# Patient Record
Sex: MALE | Race: ASIAN | Employment: OTHER | URBAN - METROPOLITAN AREA
[De-identification: names, ages, dates, MRNs, and addresses within clinical notes are randomized per-mention and may not be internally consistent; named-entity substitution may affect disease eponyms.]

---

## 2017-12-14 ENCOUNTER — HOSPITAL ENCOUNTER (OUTPATIENT)
Dept: ULTRASOUND IMAGING | Facility: HOSPITAL | Age: 72
Discharge: HOME | End: 2017-12-14
Attending: SPECIALIST
Payer: COMMERCIAL

## 2017-12-14 DIAGNOSIS — N18.30 CHRONIC KIDNEY DISEASE, STAGE III (MODERATE) (HCC): ICD-10-CM

## 2017-12-14 PROCEDURE — 76775 US EXAM ABDO BACK WALL LIM: CPT | Performed by: SPECIALIST

## 2018-01-25 ENCOUNTER — OFFICE VISIT (OUTPATIENT)
Dept: SLEEP CENTER | Facility: HOSPITAL | Age: 73
End: 2018-01-25
Attending: SPECIALIST
Payer: COMMERCIAL

## 2018-01-25 PROCEDURE — 95810 POLYSOM 6/> YRS 4/> PARAM: CPT

## 2018-01-30 NOTE — PROCEDURES
[address]       Accredited by the [facility] (AASM)    PATIENT'S NAME:        [facility] PHYSICIAN:   Johnny Bagley M.D. REFERRING PHYSICIAN:   Lan Harding M.D.   P latency was 8.9 minutes. Wake after sleep onset was 110.5 minutes. During sleep, all stages of sleep were seen. Slow-wave sleep comprised 10.6% of total sleep time. REM sleep occupied 12.8% of total sleep time with a REM latency of 103.4 minutes.   Ther needs to understand the potential dangers associated with reduced daytime vigilance. 5.   Consider further evaluation for restless leg syndrome. Thank you for your confidence in the [facility].   If you have any questions, please feel free to c

## 2019-04-02 ENCOUNTER — HOSPITAL (OUTPATIENT)
Dept: OTHER | Age: 74
End: 2019-04-02
Attending: SPECIALIST

## 2019-04-09 ENCOUNTER — ORDER TRANSCRIPTION (OUTPATIENT)
Dept: SLEEP CENTER | Facility: HOSPITAL | Age: 74
End: 2019-04-09

## 2019-04-09 DIAGNOSIS — R06.83 SNORING: Primary | ICD-10-CM

## 2019-04-09 DIAGNOSIS — R06.81 APNEA: ICD-10-CM

## 2019-04-12 ENCOUNTER — HOSPITAL ENCOUNTER (OUTPATIENT)
Dept: CV DIAGNOSTICS | Facility: HOSPITAL | Age: 74
Discharge: HOME | End: 2019-04-12
Attending: SPECIALIST
Payer: MEDICARE

## 2019-04-12 DIAGNOSIS — I25.10 CAD (CORONARY ARTERY DISEASE): ICD-10-CM

## 2019-04-12 DIAGNOSIS — R07.9 CHEST PAIN: ICD-10-CM

## 2019-04-12 PROCEDURE — 78452 HT MUSCLE IMAGE SPECT MULT: CPT | Performed by: SPECIALIST

## 2019-04-12 PROCEDURE — 93018 CV STRESS TEST I&R ONLY: CPT | Performed by: SPECIALIST

## 2019-04-12 PROCEDURE — 93017 CV STRESS TEST TRACING ONLY: CPT | Performed by: SPECIALIST

## 2019-04-24 ENCOUNTER — OFFICE VISIT (OUTPATIENT)
Dept: SLEEP CENTER | Facility: HOSPITAL | Age: 74
End: 2019-04-24
Attending: SPECIALIST
Payer: MEDICARE

## 2019-04-24 PROCEDURE — 95811 POLYSOM 6/>YRS CPAP 4/> PARM: CPT

## 2019-04-27 NOTE — PROCEDURES
[address] [place] [address]       Accredited by the [facility] (AASM)    PATIENT'S NAME:        Rebolledo Favre PHYSICIAN:   Edward Ashley M.D. REFERRING PHYSICIAN:   Phi Hu M.D.   P with AASM recommendations, hypopnea events are scored based on an oxygen saturation more than or equal to 4 percent. Body position is documented via technician notes every 15 minutes.  There is an additional channel for measurement of CPAP flow for the titr regarding the results of the study and make treatment recommendations. 2.   The patient can be initiated on CPAP at a pressure of 8 with heated humidity and the appropriate mask. 3.   Follow the patient closely to assess his clinical response to therapy.

## 2021-01-01 ENCOUNTER — APPOINTMENT (OUTPATIENT)
Dept: GENERAL RADIOLOGY | Facility: HOSPITAL | Age: 76
DRG: 207 | End: 2021-01-01
Attending: HOSPITALIST
Payer: MEDICARE

## 2021-01-01 ENCOUNTER — APPOINTMENT (OUTPATIENT)
Dept: GENERAL RADIOLOGY | Facility: HOSPITAL | Age: 76
DRG: 207 | End: 2021-01-01
Attending: STUDENT IN AN ORGANIZED HEALTH CARE EDUCATION/TRAINING PROGRAM
Payer: MEDICARE

## 2021-01-01 ENCOUNTER — APPOINTMENT (OUTPATIENT)
Dept: CT IMAGING | Facility: HOSPITAL | Age: 76
DRG: 207 | End: 2021-01-01
Attending: INTERNAL MEDICINE
Payer: MEDICARE

## 2021-01-01 ENCOUNTER — APPOINTMENT (OUTPATIENT)
Dept: ULTRASOUND IMAGING | Facility: HOSPITAL | Age: 76
DRG: 207 | End: 2021-01-01
Attending: INTERNAL MEDICINE
Payer: MEDICARE

## 2021-01-01 ENCOUNTER — APPOINTMENT (OUTPATIENT)
Dept: GENERAL RADIOLOGY | Facility: HOSPITAL | Age: 76
DRG: 207 | End: 2021-01-01
Attending: NURSE PRACTITIONER
Payer: MEDICARE

## 2021-01-01 ENCOUNTER — APPOINTMENT (OUTPATIENT)
Dept: GENERAL RADIOLOGY | Facility: HOSPITAL | Age: 76
DRG: 207 | End: 2021-01-01
Attending: INTERNAL MEDICINE
Payer: MEDICARE

## 2021-01-01 ENCOUNTER — APPOINTMENT (OUTPATIENT)
Dept: GENERAL RADIOLOGY | Facility: HOSPITAL | Age: 76
DRG: 207 | End: 2021-01-01
Attending: EMERGENCY MEDICINE
Payer: MEDICARE

## 2021-01-01 ENCOUNTER — ANESTHESIA EVENT (OUTPATIENT)
Dept: MEDSURG UNIT | Facility: HOSPITAL | Age: 76
DRG: 207 | End: 2021-01-01
Payer: MEDICARE

## 2021-01-01 ENCOUNTER — APPOINTMENT (OUTPATIENT)
Dept: CV DIAGNOSTICS | Facility: HOSPITAL | Age: 76
DRG: 207 | End: 2021-01-01
Attending: HOSPITALIST
Payer: MEDICARE

## 2021-01-01 ENCOUNTER — APPOINTMENT (OUTPATIENT)
Dept: CT IMAGING | Facility: HOSPITAL | Age: 76
DRG: 207 | End: 2021-01-01
Attending: HOSPITALIST
Payer: MEDICARE

## 2021-01-01 ENCOUNTER — APPOINTMENT (OUTPATIENT)
Dept: ULTRASOUND IMAGING | Facility: HOSPITAL | Age: 76
DRG: 207 | End: 2021-01-01
Attending: STUDENT IN AN ORGANIZED HEALTH CARE EDUCATION/TRAINING PROGRAM
Payer: MEDICARE

## 2021-01-01 ENCOUNTER — HOSPITAL ENCOUNTER (INPATIENT)
Facility: HOSPITAL | Age: 76
LOS: 35 days | DRG: 207 | End: 2021-01-01
Attending: EMERGENCY MEDICINE | Admitting: SPECIALIST
Payer: MEDICARE

## 2021-01-01 ENCOUNTER — ANESTHESIA (OUTPATIENT)
Dept: MEDSURG UNIT | Facility: HOSPITAL | Age: 76
DRG: 207 | End: 2021-01-01
Payer: MEDICARE

## 2021-01-01 ENCOUNTER — ANESTHESIA EVENT (OUTPATIENT)
Dept: ENDOSCOPY | Facility: HOSPITAL | Age: 76
DRG: 207 | End: 2021-01-01
Payer: MEDICARE

## 2021-01-01 ENCOUNTER — ANESTHESIA (OUTPATIENT)
Dept: ENDOSCOPY | Facility: HOSPITAL | Age: 76
DRG: 207 | End: 2021-01-01
Payer: MEDICARE

## 2021-01-01 VITALS
TEMPERATURE: 99 F | BODY MASS INDEX: 25.75 KG/M2 | DIASTOLIC BLOOD PRESSURE: 30 MMHG | HEIGHT: 65 IN | OXYGEN SATURATION: 47 % | WEIGHT: 154.56 LBS | SYSTOLIC BLOOD PRESSURE: 77 MMHG

## 2021-01-01 DIAGNOSIS — K92.2 GI BLEEDING: ICD-10-CM

## 2021-01-01 DIAGNOSIS — U07.1 PNEUMONIA DUE TO COVID-19 VIRUS: ICD-10-CM

## 2021-01-01 DIAGNOSIS — J12.82 PNEUMONIA DUE TO COVID-19 VIRUS: ICD-10-CM

## 2021-01-01 DIAGNOSIS — R09.02 HYPOXEMIA: Primary | ICD-10-CM

## 2021-01-01 DIAGNOSIS — E87.1 HYPONATREMIA: ICD-10-CM

## 2021-01-01 PROCEDURE — 5A0955A ASSISTANCE WITH RESPIRATORY VENTILATION, GREATER THAN 96 CONSECUTIVE HOURS, HIGH NASAL FLOW/VELOCITY: ICD-10-PCS | Performed by: STUDENT IN AN ORGANIZED HEALTH CARE EDUCATION/TRAINING PROGRAM

## 2021-01-01 PROCEDURE — 99231 SBSQ HOSP IP/OBS SF/LOW 25: CPT | Performed by: INTERNAL MEDICINE

## 2021-01-01 PROCEDURE — XW033E5 INTRODUCTION OF REMDESIVIR ANTI-INFECTIVE INTO PERIPHERAL VEIN, PERCUTANEOUS APPROACH, NEW TECHNOLOGY GROUP 5: ICD-10-PCS | Performed by: INTERNAL MEDICINE

## 2021-01-01 PROCEDURE — 99232 SBSQ HOSP IP/OBS MODERATE 35: CPT | Performed by: INTERNAL MEDICINE

## 2021-01-01 PROCEDURE — 71045 X-RAY EXAM CHEST 1 VIEW: CPT | Performed by: INTERNAL MEDICINE

## 2021-01-01 PROCEDURE — 99233 SBSQ HOSP IP/OBS HIGH 50: CPT | Performed by: INTERNAL MEDICINE

## 2021-01-01 PROCEDURE — 93306 TTE W/DOPPLER COMPLETE: CPT | Performed by: HOSPITALIST

## 2021-01-01 PROCEDURE — 71045 X-RAY EXAM CHEST 1 VIEW: CPT | Performed by: EMERGENCY MEDICINE

## 2021-01-01 PROCEDURE — 0DB68ZX EXCISION OF STOMACH, VIA NATURAL OR ARTIFICIAL OPENING ENDOSCOPIC, DIAGNOSTIC: ICD-10-PCS | Performed by: STUDENT IN AN ORGANIZED HEALTH CARE EDUCATION/TRAINING PROGRAM

## 2021-01-01 PROCEDURE — 71275 CT ANGIOGRAPHY CHEST: CPT | Performed by: INTERNAL MEDICINE

## 2021-01-01 PROCEDURE — 74018 RADEX ABDOMEN 1 VIEW: CPT | Performed by: NURSE PRACTITIONER

## 2021-01-01 PROCEDURE — 99223 1ST HOSP IP/OBS HIGH 75: CPT | Performed by: INTERNAL MEDICINE

## 2021-01-01 PROCEDURE — 3E033XZ INTRODUCTION OF VASOPRESSOR INTO PERIPHERAL VEIN, PERCUTANEOUS APPROACH: ICD-10-PCS | Performed by: INTERNAL MEDICINE

## 2021-01-01 PROCEDURE — 71045 X-RAY EXAM CHEST 1 VIEW: CPT | Performed by: NURSE PRACTITIONER

## 2021-01-01 PROCEDURE — 3E0333Z INTRODUCTION OF ANTI-INFLAMMATORY INTO PERIPHERAL VEIN, PERCUTANEOUS APPROACH: ICD-10-PCS | Performed by: EMERGENCY MEDICINE

## 2021-01-01 PROCEDURE — 0B9G8ZX DRAINAGE OF LEFT UPPER LUNG LOBE, VIA NATURAL OR ARTIFICIAL OPENING ENDOSCOPIC, DIAGNOSTIC: ICD-10-PCS | Performed by: HOSPITALIST

## 2021-01-01 PROCEDURE — 71045 X-RAY EXAM CHEST 1 VIEW: CPT | Performed by: HOSPITALIST

## 2021-01-01 PROCEDURE — B548ZZA ULTRASONOGRAPHY OF SUPERIOR VENA CAVA, GUIDANCE: ICD-10-PCS | Performed by: STUDENT IN AN ORGANIZED HEALTH CARE EDUCATION/TRAINING PROGRAM

## 2021-01-01 PROCEDURE — 76700 US EXAM ABDOM COMPLETE: CPT | Performed by: STUDENT IN AN ORGANIZED HEALTH CARE EDUCATION/TRAINING PROGRAM

## 2021-01-01 PROCEDURE — 30233N0 TRANSFUSION OF AUTOLOGOUS RED BLOOD CELLS INTO PERIPHERAL VEIN, PERCUTANEOUS APPROACH: ICD-10-PCS | Performed by: STUDENT IN AN ORGANIZED HEALTH CARE EDUCATION/TRAINING PROGRAM

## 2021-01-01 PROCEDURE — 0BH18EZ INSERTION OF ENDOTRACHEAL AIRWAY INTO TRACHEA, VIA NATURAL OR ARTIFICIAL OPENING ENDOSCOPIC: ICD-10-PCS | Performed by: ANESTHESIOLOGY

## 2021-01-01 PROCEDURE — 71045 X-RAY EXAM CHEST 1 VIEW: CPT | Performed by: STUDENT IN AN ORGANIZED HEALTH CARE EDUCATION/TRAINING PROGRAM

## 2021-01-01 PROCEDURE — 93970 EXTREMITY STUDY: CPT | Performed by: INTERNAL MEDICINE

## 2021-01-01 PROCEDURE — XW033H5 INTRODUCTION OF TOCILIZUMAB INTO PERIPHERAL VEIN, PERCUTANEOUS APPROACH, NEW TECHNOLOGY GROUP 5: ICD-10-PCS | Performed by: HOSPITALIST

## 2021-01-01 PROCEDURE — 0BJ08ZZ INSPECTION OF TRACHEOBRONCHIAL TREE, VIA NATURAL OR ARTIFICIAL OPENING ENDOSCOPIC: ICD-10-PCS | Performed by: HOSPITALIST

## 2021-01-01 PROCEDURE — 02HV33Z INSERTION OF INFUSION DEVICE INTO SUPERIOR VENA CAVA, PERCUTANEOUS APPROACH: ICD-10-PCS | Performed by: STUDENT IN AN ORGANIZED HEALTH CARE EDUCATION/TRAINING PROGRAM

## 2021-01-01 PROCEDURE — 74018 RADEX ABDOMEN 1 VIEW: CPT | Performed by: INTERNAL MEDICINE

## 2021-01-01 PROCEDURE — XW13325 TRANSFUSION OF CONVALESCENT PLASMA (NONAUTOLOGOUS) INTO PERIPHERAL VEIN, PERCUTANEOUS APPROACH, NEW TECHNOLOGY GROUP 5: ICD-10-PCS | Performed by: INTERNAL MEDICINE

## 2021-01-01 PROCEDURE — 71275 CT ANGIOGRAPHY CHEST: CPT | Performed by: HOSPITALIST

## 2021-01-01 PROCEDURE — 99291 CRITICAL CARE FIRST HOUR: CPT | Performed by: STUDENT IN AN ORGANIZED HEALTH CARE EDUCATION/TRAINING PROGRAM

## 2021-01-01 PROCEDURE — 5A1955Z RESPIRATORY VENTILATION, GREATER THAN 96 CONSECUTIVE HOURS: ICD-10-PCS | Performed by: ANESTHESIOLOGY

## 2021-01-01 PROCEDURE — 30233N1 TRANSFUSION OF NONAUTOLOGOUS RED BLOOD CELLS INTO PERIPHERAL VEIN, PERCUTANEOUS APPROACH: ICD-10-PCS | Performed by: STUDENT IN AN ORGANIZED HEALTH CARE EDUCATION/TRAINING PROGRAM

## 2021-01-01 RX ORDER — TOLVAPTAN 15 MG/1
15 TABLET ORAL ONCE
Status: COMPLETED | OUTPATIENT
Start: 2021-01-01 | End: 2021-01-01

## 2021-01-01 RX ORDER — DEXTROSE MONOHYDRATE 25 G/50ML
50 INJECTION, SOLUTION INTRAVENOUS
Status: DISCONTINUED | OUTPATIENT
Start: 2021-01-01 | End: 2021-01-01 | Stop reason: HOSPADM

## 2021-01-01 RX ORDER — ZINC SULFATE 50(220)MG
220 CAPSULE ORAL 2 TIMES DAILY
Status: DISCONTINUED | OUTPATIENT
Start: 2021-01-01 | End: 2021-01-01

## 2021-01-01 RX ORDER — SODIUM CHLORIDE 9 MG/ML
INJECTION, SOLUTION INTRAVENOUS ONCE
Status: COMPLETED | OUTPATIENT
Start: 2021-01-01 | End: 2021-01-01

## 2021-01-01 RX ORDER — ALBUMIN, HUMAN INJ 5% 5 %
SOLUTION INTRAVENOUS
Status: DISPENSED
Start: 2021-01-01 | End: 2021-01-01

## 2021-01-01 RX ORDER — GLIPIZIDE 10 MG/1
10 TABLET ORAL
COMMUNITY

## 2021-01-01 RX ORDER — ALBUTEROL SULFATE 90 UG/1
4 AEROSOL, METERED RESPIRATORY (INHALATION) EVERY 4 HOURS PRN
Status: DISCONTINUED | OUTPATIENT
Start: 2021-01-01 | End: 2021-01-01

## 2021-01-01 RX ORDER — ROSUVASTATIN CALCIUM 20 MG/1
20 TABLET, COATED ORAL NIGHTLY
COMMUNITY

## 2021-01-01 RX ORDER — DEXAMETHASONE SODIUM PHOSPHATE 4 MG/ML
6 VIAL (ML) INJECTION EVERY 24 HOURS
Status: COMPLETED | OUTPATIENT
Start: 2021-01-01 | End: 2021-01-01

## 2021-01-01 RX ORDER — DILTIAZEM HYDROCHLORIDE 5 MG/ML
2.5 INJECTION INTRAVENOUS EVERY 30 MIN PRN
Status: DISCONTINUED | OUTPATIENT
Start: 2021-01-01 | End: 2021-01-01

## 2021-01-01 RX ORDER — HYDROMORPHONE HYDROCHLORIDE 2 MG/1
2 TABLET ORAL EVERY 4 HOURS
Status: DISCONTINUED | OUTPATIENT
Start: 2021-01-01 | End: 2021-01-01

## 2021-01-01 RX ORDER — HEPARIN SODIUM 5000 [USP'U]/ML
5000 INJECTION, SOLUTION INTRAVENOUS; SUBCUTANEOUS EVERY 8 HOURS SCHEDULED
Status: DISCONTINUED | OUTPATIENT
Start: 2021-01-01 | End: 2021-01-01

## 2021-01-01 RX ORDER — GABAPENTIN 100 MG/1
100 CAPSULE ORAL 2 TIMES DAILY
Status: DISCONTINUED | OUTPATIENT
Start: 2021-01-01 | End: 2021-01-01 | Stop reason: SDUPTHER

## 2021-01-01 RX ORDER — POTASSIUM CHLORIDE 1.5 G/1.77G
40 POWDER, FOR SOLUTION ORAL ONCE
Status: COMPLETED | OUTPATIENT
Start: 2021-01-01 | End: 2021-01-01

## 2021-01-01 RX ORDER — ALBUTEROL SULFATE 90 UG/1
4 AEROSOL, METERED RESPIRATORY (INHALATION) 4 TIMES DAILY
Status: DISCONTINUED | OUTPATIENT
Start: 2021-01-01 | End: 2021-01-01

## 2021-01-01 RX ORDER — CHLORHEXIDINE GLUCONATE 0.12 MG/ML
15 RINSE ORAL
Status: DISCONTINUED | OUTPATIENT
Start: 2021-01-01 | End: 2021-01-01

## 2021-01-01 RX ORDER — PHENYLEPHRINE HCL IN 0.9% NACL 50MG/250ML
PLASTIC BAG, INJECTION (ML) INTRAVENOUS CONTINUOUS
Status: DISCONTINUED | OUTPATIENT
Start: 2021-01-01 | End: 2021-01-01

## 2021-01-01 RX ORDER — BUDESONIDE 0.5 MG/2ML
0.5 INHALANT ORAL
Status: DISCONTINUED | OUTPATIENT
Start: 2021-01-01 | End: 2021-01-01

## 2021-01-01 RX ORDER — ALBUMIN, HUMAN INJ 5% 5 %
250 SOLUTION INTRAVENOUS ONCE
Status: COMPLETED | OUTPATIENT
Start: 2021-01-01 | End: 2021-01-01

## 2021-01-01 RX ORDER — ACETYLCYSTEINE 200 MG/ML
2 SOLUTION ORAL; RESPIRATORY (INHALATION) EVERY 6 HOURS
Status: DISCONTINUED | OUTPATIENT
Start: 2021-01-01 | End: 2021-01-01

## 2021-01-01 RX ORDER — FUROSEMIDE 10 MG/ML
20 INJECTION INTRAMUSCULAR; INTRAVENOUS ONCE
Status: COMPLETED | OUTPATIENT
Start: 2021-01-01 | End: 2021-01-01

## 2021-01-01 RX ORDER — ACETYLCYSTEINE 200 MG/ML
150 INJECTION INTRAVENOUS ONCE
Status: DISCONTINUED | OUTPATIENT
Start: 2021-01-01 | End: 2021-01-01 | Stop reason: SDUPTHER

## 2021-01-01 RX ORDER — SODIUM CHLORIDE 9 MG/ML
INJECTION, SOLUTION INTRAVENOUS CONTINUOUS
Status: DISCONTINUED | OUTPATIENT
Start: 2021-01-01 | End: 2021-01-01

## 2021-01-01 RX ORDER — MINERAL OIL AND PETROLATUM 150; 830 MG/G; MG/G
OINTMENT OPHTHALMIC 2 TIMES DAILY
Status: DISCONTINUED | OUTPATIENT
Start: 2021-01-01 | End: 2021-01-01

## 2021-01-01 RX ORDER — ACETAMINOPHEN 160 MG
2000 TABLET,DISINTEGRATING ORAL DAILY
Status: DISCONTINUED | OUTPATIENT
Start: 2021-01-01 | End: 2021-01-01

## 2021-01-01 RX ORDER — NALOXONE HYDROCHLORIDE 0.4 MG/ML
80 INJECTION, SOLUTION INTRAMUSCULAR; INTRAVENOUS; SUBCUTANEOUS AS NEEDED
Status: DISCONTINUED | OUTPATIENT
Start: 2021-01-01 | End: 2021-01-01 | Stop reason: HOSPADM

## 2021-01-01 RX ORDER — ENOXAPARIN SODIUM 100 MG/ML
0.5 INJECTION SUBCUTANEOUS EVERY 12 HOURS SCHEDULED
Status: DISCONTINUED | OUTPATIENT
Start: 2021-01-01 | End: 2021-01-01

## 2021-01-01 RX ORDER — ETOMIDATE 2 MG/ML
INJECTION INTRAVENOUS
Status: DISPENSED
Start: 2021-01-01 | End: 2021-01-01

## 2021-01-01 RX ORDER — POTASSIUM CHLORIDE 20 MEQ/1
40 TABLET, EXTENDED RELEASE ORAL EVERY 4 HOURS
Status: COMPLETED | OUTPATIENT
Start: 2021-01-01 | End: 2021-01-01

## 2021-01-01 RX ORDER — SODIUM CHLORIDE 1000 MG
1 TABLET, SOLUBLE MISCELLANEOUS 2 TIMES DAILY WITH MEALS
Status: COMPLETED | OUTPATIENT
Start: 2021-01-01 | End: 2021-01-01

## 2021-01-01 RX ORDER — MORPHINE SULFATE 2 MG/ML
1 INJECTION, SOLUTION INTRAMUSCULAR; INTRAVENOUS EVERY 2 HOUR PRN
Status: DISCONTINUED | OUTPATIENT
Start: 2021-01-01 | End: 2021-01-01

## 2021-01-01 RX ORDER — DOCUSATE SODIUM 100 MG/1
100 CAPSULE, LIQUID FILLED ORAL 2 TIMES DAILY
Status: DISCONTINUED | OUTPATIENT
Start: 2021-01-01 | End: 2021-01-01

## 2021-01-01 RX ORDER — DEXAMETHASONE SODIUM PHOSPHATE 10 MG/ML
10 INJECTION, SOLUTION INTRAMUSCULAR; INTRAVENOUS ONCE
Status: COMPLETED | OUTPATIENT
Start: 2021-01-01 | End: 2021-01-01

## 2021-01-01 RX ORDER — SENNOSIDES 8.6 MG
8.6 TABLET ORAL DAILY
Status: DISCONTINUED | OUTPATIENT
Start: 2021-01-01 | End: 2021-01-01

## 2021-01-01 RX ORDER — DILTIAZEM HYDROCHLORIDE 5 MG/ML
INJECTION INTRAVENOUS
Status: COMPLETED
Start: 2021-01-01 | End: 2021-01-01

## 2021-01-01 RX ORDER — DEXTROSE AND SODIUM CHLORIDE 5; .45 G/100ML; G/100ML
INJECTION, SOLUTION INTRAVENOUS CONTINUOUS
Status: DISCONTINUED | OUTPATIENT
Start: 2021-01-01 | End: 2021-01-01

## 2021-01-01 RX ORDER — MAGNESIUM OXIDE 400 MG (241.3 MG MAGNESIUM) TABLET
400 TABLET ONCE
Status: COMPLETED | OUTPATIENT
Start: 2021-01-01 | End: 2021-01-01

## 2021-01-01 RX ORDER — ALBUTEROL SULFATE 90 UG/1
4 AEROSOL, METERED RESPIRATORY (INHALATION)
Status: DISCONTINUED | OUTPATIENT
Start: 2021-01-01 | End: 2021-01-01

## 2021-01-01 RX ORDER — SODIUM CHLORIDE, SODIUM LACTATE, POTASSIUM CHLORIDE, CALCIUM CHLORIDE 600; 310; 30; 20 MG/100ML; MG/100ML; MG/100ML; MG/100ML
INJECTION, SOLUTION INTRAVENOUS CONTINUOUS
Status: DISCONTINUED | OUTPATIENT
Start: 2021-01-01 | End: 2021-01-01

## 2021-01-01 RX ORDER — METOPROLOL TARTRATE 50 MG/1
50 TABLET, FILM COATED ORAL 2 TIMES DAILY
COMMUNITY

## 2021-01-01 RX ORDER — QUETIAPINE 100 MG/1
100 TABLET, FILM COATED ORAL EVERY 8 HOURS
Status: DISCONTINUED | OUTPATIENT
Start: 2021-01-01 | End: 2021-01-01

## 2021-01-01 RX ORDER — LACTULOSE 10 G/15ML
30 SOLUTION ORAL ONCE
Status: COMPLETED | OUTPATIENT
Start: 2021-01-01 | End: 2021-01-01

## 2021-01-01 RX ORDER — DEXMEDETOMIDINE HYDROCHLORIDE 4 UG/ML
INJECTION, SOLUTION INTRAVENOUS CONTINUOUS
Status: DISCONTINUED | OUTPATIENT
Start: 2021-01-01 | End: 2021-01-01 | Stop reason: HOSPADM

## 2021-01-01 RX ORDER — ECHINACEA PURPUREA EXTRACT 125 MG
1 TABLET ORAL
Status: DISCONTINUED | OUTPATIENT
Start: 2021-01-01 | End: 2021-01-01

## 2021-01-01 RX ORDER — ACETAMINOPHEN 325 MG/1
650 TABLET ORAL EVERY 6 HOURS PRN
Status: DISCONTINUED | OUTPATIENT
Start: 2021-01-01 | End: 2021-01-01

## 2021-01-01 RX ORDER — ACETYLCYSTEINE 200 MG/ML
600 SOLUTION ORAL; RESPIRATORY (INHALATION) 2 TIMES DAILY
Status: DISCONTINUED | OUTPATIENT
Start: 2021-01-01 | End: 2021-01-01

## 2021-01-01 RX ORDER — POLYETHYLENE GLYCOL 3350 17 G/17G
17 POWDER, FOR SOLUTION ORAL DAILY PRN
Status: DISCONTINUED | OUTPATIENT
Start: 2021-01-01 | End: 2021-01-01

## 2021-01-01 RX ORDER — ATORVASTATIN CALCIUM 40 MG/1
40 TABLET, FILM COATED ORAL NIGHTLY
Status: DISCONTINUED | OUTPATIENT
Start: 2021-01-01 | End: 2021-01-01

## 2021-01-01 RX ORDER — DEXAMETHASONE 4 MG/1
4 TABLET ORAL DAILY
Status: DISCONTINUED | OUTPATIENT
Start: 2021-01-01 | End: 2021-01-01

## 2021-01-01 RX ORDER — DEXTROSE MONOHYDRATE 25 G/50ML
50 INJECTION, SOLUTION INTRAVENOUS
Status: DISCONTINUED | OUTPATIENT
Start: 2021-01-01 | End: 2021-01-01

## 2021-01-01 RX ORDER — POTASSIUM CHLORIDE 1.5 G/1.77G
40 POWDER, FOR SOLUTION ORAL EVERY 4 HOURS
Status: COMPLETED | OUTPATIENT
Start: 2021-01-01 | End: 2021-01-01

## 2021-01-01 RX ORDER — LISINOPRIL 2.5 MG/1
2.5 TABLET ORAL DAILY
Status: DISCONTINUED | OUTPATIENT
Start: 2021-01-01 | End: 2021-01-01

## 2021-01-01 RX ORDER — METOPROLOL TARTRATE 5 MG/5ML
5 INJECTION INTRAVENOUS EVERY 6 HOURS PRN
Status: DISCONTINUED | OUTPATIENT
Start: 2021-01-01 | End: 2021-01-01

## 2021-01-01 RX ORDER — LISINOPRIL 2.5 MG/1
2.5 TABLET ORAL DAILY
COMMUNITY

## 2021-01-01 RX ORDER — ENOXAPARIN SODIUM 100 MG/ML
40 INJECTION SUBCUTANEOUS NIGHTLY
Status: DISCONTINUED | OUTPATIENT
Start: 2021-01-01 | End: 2021-01-01

## 2021-01-01 RX ORDER — METOPROLOL TARTRATE 50 MG/1
50 TABLET, FILM COATED ORAL
Status: DISCONTINUED | OUTPATIENT
Start: 2021-01-01 | End: 2021-01-01

## 2021-01-01 RX ORDER — IPRATROPIUM BROMIDE AND ALBUTEROL SULFATE 2.5; .5 MG/3ML; MG/3ML
3 SOLUTION RESPIRATORY (INHALATION)
Status: DISCONTINUED | OUTPATIENT
Start: 2021-01-01 | End: 2021-01-01

## 2021-01-01 RX ORDER — DEXAMETHASONE SODIUM PHOSPHATE 4 MG/ML
6 VIAL (ML) INJECTION EVERY 12 HOURS
Status: DISCONTINUED | OUTPATIENT
Start: 2021-01-01 | End: 2021-01-01

## 2021-01-01 RX ORDER — ACETAMINOPHEN 160 MG/5ML
650 SOLUTION ORAL EVERY 6 HOURS PRN
Status: DISCONTINUED | OUTPATIENT
Start: 2021-01-01 | End: 2021-01-01

## 2021-01-01 RX ORDER — DILTIAZEM HYDROCHLORIDE 5 MG/ML
2.5 INJECTION INTRAVENOUS ONCE
Status: COMPLETED | OUTPATIENT
Start: 2021-01-01 | End: 2021-01-01

## 2021-01-01 RX ORDER — ACARBOSE 50 MG/1
50 TABLET ORAL
COMMUNITY

## 2021-01-01 RX ORDER — ASPIRIN 81 MG/1
81 TABLET, CHEWABLE ORAL DAILY
Status: DISCONTINUED | OUTPATIENT
Start: 2021-01-01 | End: 2021-01-01

## 2021-01-01 RX ORDER — ASPIRIN 81 MG/1
81 TABLET ORAL DAILY
Status: DISCONTINUED | OUTPATIENT
Start: 2021-01-01 | End: 2021-01-01 | Stop reason: SDUPTHER

## 2021-01-01 RX ORDER — DEXMEDETOMIDINE HYDROCHLORIDE 4 UG/ML
INJECTION, SOLUTION INTRAVENOUS CONTINUOUS
Status: DISCONTINUED | OUTPATIENT
Start: 2021-01-01 | End: 2021-01-01

## 2021-01-01 RX ORDER — BISACODYL 10 MG
10 SUPPOSITORY, RECTAL RECTAL
Status: DISCONTINUED | OUTPATIENT
Start: 2021-01-01 | End: 2021-01-01

## 2021-01-01 RX ORDER — ASCORBIC ACID 500 MG
1000 TABLET ORAL DAILY
Status: DISCONTINUED | OUTPATIENT
Start: 2021-01-01 | End: 2021-01-01

## 2021-01-01 RX ORDER — GUAIFENESIN 600 MG
600 TABLET, EXTENDED RELEASE 12 HR ORAL 2 TIMES DAILY
Status: DISCONTINUED | OUTPATIENT
Start: 2021-01-01 | End: 2021-01-01 | Stop reason: SDUPTHER

## 2021-01-01 RX ORDER — FUROSEMIDE 10 MG/ML
20 INJECTION INTRAMUSCULAR; INTRAVENOUS 3 TIMES DAILY
Status: DISCONTINUED | OUTPATIENT
Start: 2021-01-01 | End: 2021-01-01

## 2021-01-01 RX ORDER — SODIUM PHOSPHATE, DIBASIC AND SODIUM PHOSPHATE, MONOBASIC 7; 19 G/133ML; G/133ML
1 ENEMA RECTAL ONCE AS NEEDED
Status: DISCONTINUED | OUTPATIENT
Start: 2021-01-01 | End: 2021-01-01

## 2021-01-01 RX ORDER — SODIUM CHLORIDE 1000 MG
1 TABLET, SOLUBLE MISCELLANEOUS DAILY
Status: DISCONTINUED | OUTPATIENT
Start: 2021-01-01 | End: 2021-01-01

## 2021-01-01 RX ORDER — POTASSIUM CHLORIDE 1.5 G/1.77G
40 POWDER, FOR SOLUTION ORAL EVERY 4 HOURS
Status: DISPENSED | OUTPATIENT
Start: 2021-01-01 | End: 2021-01-01

## 2021-01-01 RX ORDER — MIDAZOLAM HYDROCHLORIDE 1 MG/ML
1 INJECTION INTRAMUSCULAR; INTRAVENOUS ONCE
Status: COMPLETED | OUTPATIENT
Start: 2021-01-01 | End: 2021-01-01

## 2021-01-01 RX ORDER — METOCLOPRAMIDE HYDROCHLORIDE 5 MG/ML
10 INJECTION INTRAMUSCULAR; INTRAVENOUS EVERY 6 HOURS
Status: DISCONTINUED | OUTPATIENT
Start: 2021-01-01 | End: 2021-01-01

## 2021-01-01 RX ORDER — POTASSIUM CHLORIDE 14.9 MG/ML
20 INJECTION INTRAVENOUS ONCE
Status: COMPLETED | OUTPATIENT
Start: 2021-01-01 | End: 2021-01-01

## 2021-01-01 RX ORDER — DEXAMETHASONE SODIUM PHOSPHATE 4 MG/ML
5 VIAL (ML) INJECTION EVERY 24 HOURS
Status: DISCONTINUED | OUTPATIENT
Start: 2021-01-01 | End: 2021-01-01

## 2021-01-01 RX ORDER — MIDAZOLAM HYDROCHLORIDE 1 MG/ML
INJECTION INTRAMUSCULAR; INTRAVENOUS
Status: DISPENSED
Start: 2021-01-01 | End: 2021-01-01

## 2021-01-01 RX ORDER — GABAPENTIN 250 MG/5ML
100 SOLUTION ORAL 2 TIMES DAILY
Status: DISCONTINUED | OUTPATIENT
Start: 2021-01-01 | End: 2021-01-01

## 2021-01-01 RX ORDER — DEXAMETHASONE SODIUM PHOSPHATE 4 MG/ML
6 VIAL (ML) INJECTION EVERY 24 HOURS
Status: DISCONTINUED | OUTPATIENT
Start: 2021-01-01 | End: 2021-01-01

## 2021-01-01 RX ORDER — ACETYLCYSTEINE 200 MG/ML
50 INJECTION INTRAVENOUS DAILY
Status: DISCONTINUED | OUTPATIENT
Start: 2021-01-01 | End: 2021-01-01 | Stop reason: SDUPTHER

## 2021-01-01 RX ORDER — METOCLOPRAMIDE HYDROCHLORIDE 5 MG/ML
10 INJECTION INTRAMUSCULAR; INTRAVENOUS EVERY 8 HOURS
Status: DISCONTINUED | OUTPATIENT
Start: 2021-01-01 | End: 2021-01-01

## 2021-01-01 RX ORDER — ACETAMINOPHEN 650 MG/1
650 SUPPOSITORY RECTAL EVERY 6 HOURS PRN
Status: DISCONTINUED | OUTPATIENT
Start: 2021-01-01 | End: 2021-01-01

## 2021-01-01 RX ORDER — DEXTROSE MONOHYDRATE 25 G/50ML
INJECTION, SOLUTION INTRAVENOUS
Status: COMPLETED
Start: 2021-01-01 | End: 2021-01-01

## 2021-01-01 RX ORDER — DEXAMETHASONE SODIUM PHOSPHATE 4 MG/ML
6 VIAL (ML) INJECTION DAILY
Status: DISCONTINUED | OUTPATIENT
Start: 2021-01-01 | End: 2021-01-01

## 2021-01-01 RX ORDER — FUROSEMIDE 10 MG/ML
20 INJECTION INTRAMUSCULAR; INTRAVENOUS
Status: DISCONTINUED | OUTPATIENT
Start: 2021-01-01 | End: 2021-01-01

## 2021-01-01 RX ORDER — DEXAMETHASONE SODIUM PHOSPHATE 4 MG/ML
4 VIAL (ML) INJECTION DAILY
Status: DISCONTINUED | OUTPATIENT
Start: 2021-01-01 | End: 2021-01-01

## 2021-01-01 RX ORDER — METOPROLOL SUCCINATE 25 MG/1
25 TABLET, EXTENDED RELEASE ORAL DAILY
Status: DISCONTINUED | OUTPATIENT
Start: 2021-01-01 | End: 2021-01-01

## 2021-01-01 RX ORDER — HYDROMORPHONE HYDROCHLORIDE 1 MG/ML
1 INJECTION, SOLUTION INTRAMUSCULAR; INTRAVENOUS; SUBCUTANEOUS EVERY 2 HOUR PRN
Status: DISCONTINUED | OUTPATIENT
Start: 2021-01-01 | End: 2021-01-01

## 2021-01-01 RX ORDER — ALBUTEROL SULFATE 90 UG/1
4 AEROSOL, METERED RESPIRATORY (INHALATION) EVERY 4 HOURS
Status: DISCONTINUED | OUTPATIENT
Start: 2021-01-01 | End: 2021-01-01

## 2021-01-01 RX ORDER — ONDANSETRON 2 MG/ML
4 INJECTION INTRAMUSCULAR; INTRAVENOUS EVERY 6 HOURS PRN
Status: DISCONTINUED | OUTPATIENT
Start: 2021-01-01 | End: 2021-01-01

## 2021-01-01 RX ORDER — METOCLOPRAMIDE HYDROCHLORIDE 5 MG/ML
5 INJECTION INTRAMUSCULAR; INTRAVENOUS EVERY 6 HOURS
Status: DISCONTINUED | OUTPATIENT
Start: 2021-01-01 | End: 2021-01-01

## 2021-01-01 RX ORDER — DEXMEDETOMIDINE HYDROCHLORIDE 4 UG/ML
INJECTION, SOLUTION INTRAVENOUS
Status: DISPENSED
Start: 2021-01-01 | End: 2021-01-01

## 2021-01-01 RX ORDER — MIDAZOLAM HYDROCHLORIDE 1 MG/ML
2 INJECTION INTRAMUSCULAR; INTRAVENOUS EVERY 30 MIN PRN
Status: DISCONTINUED | OUTPATIENT
Start: 2021-01-01 | End: 2021-01-01

## 2021-01-01 RX ORDER — ROSUVASTATIN CALCIUM 20 MG/1
20 TABLET, COATED ORAL NIGHTLY
Status: DISCONTINUED | OUTPATIENT
Start: 2021-01-01 | End: 2021-01-01

## 2021-01-01 RX ORDER — HYDROMORPHONE HYDROCHLORIDE 1 MG/ML
0.5 INJECTION, SOLUTION INTRAMUSCULAR; INTRAVENOUS; SUBCUTANEOUS EVERY 2 HOUR PRN
Status: DISCONTINUED | OUTPATIENT
Start: 2021-01-01 | End: 2021-01-01

## 2021-01-01 RX ORDER — ALBUMIN (HUMAN) 12.5 G/50ML
25 SOLUTION INTRAVENOUS EVERY 8 HOURS
Status: COMPLETED | OUTPATIENT
Start: 2021-01-01 | End: 2021-01-01

## 2021-01-01 RX ORDER — LISINOPRIL 10 MG/1
10 TABLET ORAL DAILY
Status: DISCONTINUED | OUTPATIENT
Start: 2021-01-01 | End: 2021-01-01

## 2021-03-10 PROBLEM — R09.02 HYPOXEMIA: Status: ACTIVE | Noted: 2021-01-01

## 2021-03-10 PROBLEM — J12.82 PNEUMONIA DUE TO COVID-19 VIRUS: Status: ACTIVE | Noted: 2021-01-01

## 2021-03-10 PROBLEM — U07.1 PNEUMONIA DUE TO COVID-19 VIRUS: Status: ACTIVE | Noted: 2021-01-01

## 2021-03-10 PROBLEM — U07.1 COVID-19: Status: ACTIVE | Noted: 2021-01-01

## 2021-03-10 PROBLEM — E87.1 HYPONATREMIA: Status: ACTIVE | Noted: 2021-01-01

## 2021-03-11 NOTE — PROGRESS NOTES
NURSING ADMISSION NOTE      Patient admitted via Cart  Oriented to room. Safety precautions initiated. Bed in low position. Call light in reach. Pt in room 503. Alert and orientedx4, Claudia speaking, was able to reach family to translate.  VS taken

## 2021-03-11 NOTE — PROGRESS NOTES
03/10/21 2230   Mobility   O2 walk?  Yes   SPO2% on Room Air at Rest 87   SPO2% on Oxygen at Rest 95   At rest oxygen flow (liters per minute) 3   SPO2% Ambulation on Room Air 77   SPO2% Ambulation on Oxygen 93   Ambulation oxygen flow (liters per minute

## 2021-03-11 NOTE — CM/SW NOTE
03/11/21 1300   CM/SW Screening   Referral [facility] staff; Chart review;Nursing rounds   Patient's Mental Status Alert;Oriented   Patient's [address]   Patient lives with Spouse; Children   Patient Status P

## 2021-03-11 NOTE — ED NOTES
Charge RN on ready floor states RN taking pt needing another 10min prior to getting report on pt.  Will let receiving ED RN know for report

## 2021-03-11 NOTE — CONSULTS
[facility] Note  [facility]  Report of Consultation    Festus Waller Patient Status:  Inpatient    1945 MRN [id number]   [facility] 5NW-A Attending Mirna Jaramillo MD   [facility] Day Subcutaneous TID CC and HS   • metoprolol Tartrate  50 mg Oral 2x Daily(Beta Blocker)   • lisinopril  2.5 mg Oral Daily   • Rosuvastatin Calcium  20 mg Oral Nightly   • aspirin  81 mg Oral Daily     acetaminophen, ondansetron HCl, metoprolol Tartrate    Re 120 — 91 % — —   03/10/21 2031 146/82 — — 114 — 94 % — —   03/10/21 2026 142/55 (!) 100.5 °F (38.1 °C) Oral 107 (!) 28 94 % — 150 lb (68 kg)   03/10/21 1945 144/83 — — 108 (!) 35 93 % — —   03/10/21 1930 137/84 — — 84 (!) 30 94 % — —   03/10/21 1759 — — — Labs: Procal 0.23    ABG:     No results for input(s): ABGPHT, NIRXLX5K, OBIMV9R, ABGHCO3, ABGBE, TEMP, TRACIE, SITE, DEV, THGB in the last 168 hours. Invalid input(s): [id number], CHOB    Cultures:     No results found for this visit on 03/10/21.   Recent

## 2021-03-11 NOTE — ED PROVIDER NOTES
Patient Seen in: [facility] Emergency Department      History   Patient presents with:  Medication Administration    Stated Complaint: pos covid here forl antibodies    HPI/Subjective:   HPI    58-year-old male sent to the emergency room for COVID-1 Appears in mild distress. HEENT: Sclerae anicteric. Conjunctivae show no pallor. Oropharynx clear, mucous membranes moist   Neck: supple, no rigidity   Lungs: Tachypneic.   Good air exchange and clear   Heart: regular rate rhythm and no murmur   Abdomen: other components within normal limits   CBC W/ DIFFERENTIAL - Abnormal; Notable for the following components:    HGB 12.2 (*)     MCV 73.3 (*)     MCH 22.4 (*)     MCHC 30.6 (*)     Lymphocyte Absolute 0.88 (*)     All other components within normal limits to the ER for monoclonal antibody infusion. On arrival he is hypoxic to 87% and tachypneic. O2 saturation is improved 2 L by nasal cannula. Labs and chest x-ray ordered. Patient will be hospitalized. Labs show hyponatremia.   Patient agrees to plan for

## 2021-03-11 NOTE — H&P
40 Atka Bishop Carroll Patient Status:  Inpatient    1945 MRN [id number]   [facility] 5NW-A Attending Diego Stanley MD   Hosp Day # 1 PCP Garth Mead MD     Date:  3/11/2021  Date of Admissio meals. Rosuvastatin Calcium 20 MG Oral Tab, Take 20 mg by mouth nightly. Metoprolol Tartrate 50 MG Oral Tab, Take 50 mg by mouth 2 (two) times daily. glipiZIDE 10 MG Oral Tab, Take 10 mg by mouth 2 (two) times daily before meals.   lisinopril 2.5 MG Oral 35.2 (L) 03/11/2021    .0 03/11/2021    CREATSERUM 1.33 (H) 03/11/2021    BUN 19 (H) 03/11/2021     (L) 03/11/2021    K 4.2 03/11/2021    CL 99 03/11/2021    CO2 24.0 03/11/2021     (H) 03/11/2021    CA 7.9 (L) 03/11/2021    ALB 2.7 (L) COVID-19  Apparently multiple family members at home that were positive last week, admitted with fever, shortness of breath, positive chest x-ray showing bilateral infiltrates with hypoxia, oxygenation, remdesivir, Decadron, possible CCP but defer to ID

## 2021-03-11 NOTE — CONSULTS
INFECTIOUS DISEASE CONSULTATION    Trell Riggins Patient Status:  Inpatient    1945 MRN [id number]   [facility] 5NW-A Attending Rodger Martinez MD   Hosp Day # 1 PCP Brayden Constantino Intravenous, Q6H PRN  •  metoprolol Tartrate (LOPRESSOR) injection 5 mg, 5 mg, Intravenous, Q6H PRN  •  Enoxaparin Sodium (LOVENOX) 40 MG/0.4ML injection 40 mg, 40 mg, Subcutaneous, Nightly  •  Insulin Aspart Pen (NOVOLOG) 100 UNIT/ML flexpen 1-40 Units, 1 nondistended. Musculoskeletal: Full range of motion of all extremities. No swelling noted. Joints: no effusions  Skin: No lesions.  No erythema, no open wounds      Laboratory Data:  Laboratory data reviewed      Recent Labs   Lab 03/11/21  7213   RBC reasonable to offer         Judene Landau, MD  [facility]  [phone]

## 2021-03-11 NOTE — CONSULTS
[facility]      Endocrinology Consultation    Odalys Roger Patient Status:  Inpatient    1945 MRN [id number]   [facility] 5NW-A Attending Fabiana Gutierrez MD   Hosp Day # 1 PCP Haseeb Liz MD     Reason for Consultation: • High cholesterol    • Muscle weakness    • Osteoarthritis    • Sleep apnea      Past Surgical History:   Procedure Laterality Date   • BACK SURGERY      04/03/2006 Spinal sirgery   • OTHER SURGICAL HISTORY      Hernia surgery 06/20/2009    • OTHER SURG BMI 24.96 kg/m²   I did not enter in the room for preservation of PPE    Labs      Recent Labs     03/10/21  2209 03/11/21  0718 03/11/21  1215   PGLU 274* 425* 294*     Component      Latest Ref Rng & Units 3/10/2021   HEMOGLOBIN A1c      <5.7 % 7.9 (H)

## 2021-03-11 NOTE — PROGRESS NOTES
COVID-19 Daily Discharge Readiness-Nursing    O2 Sat at Rest:  94  % 4.5L  O2 Sat with Exertion: 88  % on 7  liters   Temperature max from last 24 hrs: Temp (24hrs), Av °F (37.2 °C), Min:98.2 °F (36.8 °C), Max:100.5 °F (38.1 °C)    Inflammatory Markers

## 2021-03-12 NOTE — PROGRESS NOTES
[facility] Pulmonary/Critical Care Progress Note     SUBJECTIVE/Interval history: All events, procedures, notes reviewed. Pt requring 10-12 LPM O2 now. No cp or sob.  When proning, his O2 sat improves to 90's but is in 8 GFRAA 54* 60 60   GFRNAA 47* 52* 52*   CA 8.4* 7.9* 8.1*   * 128* 134*   K 4.1 4.2 4.4    99 103   CO2 24.0 24.0 26.0     Recent Labs   Lab 03/10/21  1805 03/11/21  0433 03/12/21  0527   RBC 5.44 4.72 4.80   HGB 12.2* 10.7* 10.9*   HCT 39.9 3 with multifocal COVID-19 pneumonia.     Dictated by (CST): Biju Oliveira MD on 3/10/2021 at 7:01 PM     Finalized by (CST): Biju Oliveira MD on 3/10/2021 at 7:02 PM         • dexamethasone  6 mg Oral 2 times per day   • guaiFENesin ER  600 mg Oral B

## 2021-03-12 NOTE — COVID NURSING ASSESSMENT
COVID-19 Daily Discharge Readiness-Nursing    O2 Sat at Rest:  95  % on 12L while prones or side lying  O2 Sat with Exertion: 90% on 15L HFNC when moving in the bed or laying on back/sitting in bed  Temperature max from last 24 hrs: Temp (24hrs), Av.2

## 2021-03-12 NOTE — PLAN OF CARE
COVID-19 Daily Discharge Readiness-Nursing    O2 Sat at Rest:  SPO2% on Room Air at Rest: 87  %   O2 Sat with Exertion: SPO2% Ambulation on Oxygen: 93  % on Ambulation oxygen flow (liters per minute): 5  liters   Temperature max from last 24 hrs: Temp (24h Patient's Short Term Goal:   3/10 NOC: control fevers and monitor SpO2   3/11 AM: wean O2  3/11 NOC: wean O2 and sleep well     Interventions:   - O2, tylenol PRN, prone, IS use.    - See additional Care Plan goals for specific interventions  Outcome: Progr

## 2021-03-12 NOTE — PROGRESS NOTES
[facility]  Progress Note    Cathlene Meals Patient Status:  Inpatient    1945 MRN [id number]   [facility] 5NW-A Attending Zahida Mtz MD   Hosp Day # 2 PCP Bryon Hollins MD         SUBJECTIVE:  Subjective:  [place] Recent Labs   Lab 03/11/21  1215 03/11/21  1710 03/11/21  1933 03/11/21  2101 03/12/21  0801   PGLU 294* 178* 84 246* 238*       No results for input(s): URINE, CULTI, BLDSMR in the last 168 hours. Hospital Encounter on 03/10/21   1.  BLOOD CULTURE COVID-19 pneumonia.     Dictated by (CST): Nash Stewart MD on 3/10/2021 at 7:01 PM     Finalized by (CST): Nash Stewart MD on 3/10/2021 at 7:02 PM           Meds:     • dexamethasone  6 mg Oral 2 times per day   • tocilizumab (ACTEMRA) infusion fo follow-up     Neuropathy–gabapentin     Hypertension–lisinopril     IDDM with complications–Levemir, NovoLog sliding scale     Dyslipidemia–statin     Leukopenia due to COVID-19–monitor counts     Anemia–anemia of chronic disease     DVT prophylaxis–subcu

## 2021-03-12 NOTE — PROGRESS NOTES
[facility]                INFECTIOUS DISEASE PROGRESS NOTE    Trell Riggins Patient Status:  Inpatient    1945 MRN [id number]   [facility] 5NW-A Attending Rodger Martinez MD   Hosp Day # 2 PCP Isabelle Dominguez MD     Ant 4.2 4.4    99 103   CO2 24.0 24.0 26.0   ALKPHO 50 46 50   AST 66* 54* 103*   ALT 39 38 63*   BILT 0.4 0.2 0.2   TP 8.4* 7.4 7.2       No results found for: [facility] Encounter on 03/10/21   1.  BLOOD CULTURE     Status: None (Pr

## 2021-03-13 NOTE — PROGRESS NOTES
[facility]                INFECTIOUS DISEASE PROGRESS NOTE    Shen Mcarthur Patient Status:  Inpatient    1945 MRN [id number]   [facility] 5NW-A Attending Kevin Angel MD   Hosp Day # 3 PCP Remy Moya MD     Ant 19* 32* 41*   CREATSERUM 1.33* 1.32* 1.43*   GFRAA 60 60 55*   GFRNAA 52* 52* 47*   CA 7.9* 8.1* 8.2*   ALB 2.7* 2.6* 2.5*   * 134* 132*   K 4.2 4.4 4.6   CL 99 103 101   CO2 24.0 26.0 22.0   ALKPHO 46 50 67   AST 54* 103* 86*   ALT 38 63* 85*   BILT

## 2021-03-13 NOTE — PROGRESS NOTES
[facility]  Progress Note    Paul Gibbons Patient Status:  Inpatient    1945 MRN [id number]   [facility] 5NW-A Attending Shabnam North MD   Hosp Day # 3 PCP Graham Zuleta MD         SUBJECTIVE:  Subjective:  Hillery Cockayne Recent Labs   Lab 03/10/21  1805 03/11/21  0433 03/12/21  0534 03/13/21  0655   ALT 39 38 63* 85*   AST 66* 54* 103* 86*   ALB 3.2* 2.7* 2.6* 2.5*   *  --  546* 618*       Recent Labs   Lab 03/12/21  0801 03/12/21  1201 03/12/21  1658 03/12/21 COVID-19 pneumonia. Mildly enlarged cardiac silhouette. No significant pleural fluid or pneumothorax. CONCLUSION:  Imaging findings are consistent with multifocal COVID-19 pneumonia.     Dictated by (CST): Sylvie Kussmaul, MD on 3/10/2021 at 7 Pneumonia due to COVID-19 virus  IV antibiotics, pulmonary follow-up     Neuropathy–gabapentin     Hypertension–lisinopril     IDDM with complications–Levemir, NovoLog sliding scale     Dyslipidemia–statin     Leukopenia due to COVID-19–monitor counts

## 2021-03-13 NOTE — PLAN OF CARE
COVID-19 Daily Discharge Readiness-Nursing    O2 Sat at Rest:  93% on 8-10L   O2 Sat with Exertion: SPO2% Ambulation on Oxygen: 90  % on Ambulation oxygen flow (liters per minute): 15  liters   Temperature max from last 24 hrs: Temp (24hrs), Av.1 °F (3 Care Plan goals for specific interventions  Outcome: Progressing  Goal: Patient/Family Short Term Goal  Description: Patient's Short Term Goal:   3/10 NOC: control fevers and monitor SpO2   3/11 AM: wean O2  3/11 NOC: wean O2 and sleep well   3/12 AM: wean

## 2021-03-13 NOTE — PLAN OF CARE
COVID-19 Daily Discharge Readiness-Nursing    O2 Sat at Rest:  SPO2% on Room Air at Rest: 87  %   O2 Sat with Exertion: SPO2% Ambulation on Oxygen: 90  % on Ambulation oxygen flow (liters per minute): 15  liters   Temperature max from last 24 hrs: Temp (24 diabetes  Outcome: Progressing     Problem: Patient/Family Goals  Goal: Patient/Family Long Term Goal  Description: Patient's Long Term Goal:   Discharge to home     Interventions:  - Follow plan of care   - See additional Care Plan goals for specific inte

## 2021-03-13 NOTE — PROGRESS NOTES
[facility]  Progress Note    Tonya Shelter Patient Status:  Inpatient    1945 MRN [id number]   [facility] 5NW-A Attending Reginaldo Beltran MD   Hosp Day # 3 PCP Chely Collier MD     Subjective:  Tonya Winn is a(n) 67 last 168 hours. Invalid input(s): [id number], CHOB    Invalid input(s): [address], [address], [address] , [address]    COVID-19 Lab Results    COVID-19  No results found for: COVID19    Pro-Calcitonin  Recent Labs   Lab 03/10/21  1805 03/12/21  1117   PCT 0. Intravenous, Q6H PRN  metoprolol Tartrate (LOPRESSOR) injection 5 mg, 5 mg, Intravenous, Q6H PRN  Enoxaparin Sodium (LOVENOX) 40 MG/0.4ML injection 40 mg, 40 mg, Subcutaneous, Nightly  Insulin Aspart Pen (NOVOLOG) 100 UNIT/ML flexpen 1-40 Units, 1-40 Units

## 2021-03-14 NOTE — PROGRESS NOTES
[facility]                INFECTIOUS DISEASE PROGRESS NOTE    Narendra Reyes Patient Status:  Inpatient    1945 MRN [id number]   [facility] 5NW-A Attending Alirio Carroll MD   Hosp Day # 4 PCP Taco Miguel MD     Ant CREATSERUM 1.32* 1.43* 1.38*   GFRAA 60 55* 57*   GFRNAA 52* 47* 49*   CA 8.1* 8.2* 8.0*   ALB 2.6* 2.5* 2.5*   * 132* 133*   K 4.4 4.6 4.6    101 102   CO2 26.0 22.0 23.0   ALKPHO 50 67 77   * 86* 55*   ALT 63* 85* 70*   BILT 0.2 0.3

## 2021-03-14 NOTE — PROGRESS NOTES
[facility]  Progress Note    Melanie Carter Patient Status:  Inpatient    1945 MRN [id number]   [facility] 5NW-A Attending Mary Arteaga MD   Hosp Day # 4 PCP Rony Guillen MD         SUBJECTIVE:  Subjective:  Jerica Ames 4.4 4.6 4.6    99 103 101 102   CO2 24.0 24.0 26.0 22.0 23.0   BUN 22* 19* 32* 41* 46*   CREATSERUM 1.44* 1.33* 1.32* 1.43* 1.38*   CA 8.4* 7.9* 8.1* 8.2* 8.0*   * 317* 199* 290* 284*       Recent Labs   Lab 03/10/21  1805 03/11/21  0433 03/12 antibodies  PATIENT STATED HISTORY: (As transcribed by Technologist)  Patient is COVID+ with worsening symptoms. FINDINGS:  There is diffuse airspace disease, most notable within the mid and lower lungs, consistent with multifocal COVID-19 pneumonia.   Eric Hager COVID-19  Apparently multiple family members at home that were positive last week, admitted with fever, shortness of breath, positive chest x-ray showing bilateral infiltrates with hypoxia, oxygenation, remdesivir, Decadron, possible CCP but defer to ID

## 2021-03-14 NOTE — PLAN OF CARE
COVID-19 Daily Discharge Readiness-Nursing    O2 Sat at Rest:  SPO2% on Room Air at Rest: 87  %   O2 Sat with Exertion: SPO2% Ambulation on Oxygen: 90  % on Ambulation oxygen flow (liters per minute): 15  liters   Temperature max from last 24 hrs: Temp (24 Goal  Description: Patient's Long Term Goal:   Discharge to home     Interventions:  - Follow plan of care   - See additional Care Plan goals for specific interventions  Outcome: Progressing  Goal: Patient/Family Short Term Goal  Description: Patient's Dora

## 2021-03-14 NOTE — PROGRESS NOTES
CERTIFICATE OF WORK    2/6/2020      Re: Toshia Ellisrborn        [address]  [place] [address]      This is to certify that Toshia Burnham has been under my care. Please allow patient work from home during inclement weather due to recent left foot surgery.                SAMUEL Manuel DPM  [facility] [address] [facility] [address] [address] [address] [id number] [phone]   [facility]  Progress Note    Ernestina Acuna Patient Status:  Inpatient    1945 MRN [id number]   [facility] 5NW-A Attending Damaris Song MD   Hosp Day # 4 PCP Mega Dominguez MD     Subjective:  Ernestina Acuna is a(n) 67 SARY    Invalid input(s): Chinedu Ramirez, [address] [place] [address], [address] [place] [address]    COVID-19 Lab Results    COVID-19  No results found for: COVID19    Pro-Calcitonin  Recent Labs   Lab 03/10/21  1805 03/12/21  1117   PCT 0.23* 0.23*       Cardiac  Recent Labs   Lab 03/1 injection 5 mg, 5 mg, Intravenous, Q6H PRN  Enoxaparin Sodium (LOVENOX) 40 MG/0.4ML injection 40 mg, 40 mg, Subcutaneous, Nightly  Insulin Aspart Pen (NOVOLOG) 100 UNIT/ML flexpen 1-40 Units, 1-40 Units, Subcutaneous, TID CC and HS  Metoprolol Tartrate (LO

## 2021-03-14 NOTE — PLAN OF CARE
COVID-19 Daily Discharge Readiness-Nursing    O2 Sat at Rest:  93% on 7L  O2 Sat with Exertion:   Temperature max from last 24 hrs: Temp (24hrs), Av.9 °F (36.6 °C), Min:97.4 °F (36.3 °C), Max:98.3 °F (36.8 °C)    Inflammatory Markers:   Recent Labs   L wean O2  3/11 NOC: wean O2 and sleep well   3/12 AM: wean down O2, prone as much as possible   3/12NOC: prone, wean down Oxygen   3/13 AM: prone, wean O2   3/13NOC: wean off O2 and start getting back to baseline  3/14 AM: wean O2     Interventions:   - O2,

## 2021-03-15 NOTE — PROGRESS NOTES
[facility]  Progress Note    Jessica Oh Patient Status:  Inpatient    1945 MRN [id number]   [facility] 5NW-A Attending Mickey Recinos MD   Hosp Day # 5 PCP Fany Sanches MD         SUBJECTIVE:  Subjective:  Maricarmen Brown 03/10/21  1805 03/11/21  0433 03/12/21  0534 03/13/21  0655 03/14/21  0631 03/15/21  0539   ALT 39 38 63* 85* 70* 61   AST 66* 54* 103* 86* 55* 44*   ALB 3.2* 2.7* 2.6* 2.5* 2.5* 2.6*   *  --  546* 618* 637*  --        Recent Labs   Lab 03/14/21  18 lower lungs, consistent with multifocal COVID-19 pneumonia. Mildly enlarged cardiac silhouette. No significant pleural fluid or pneumothorax. CONCLUSION:  Imaging findings are consistent with multifocal COVID-19 pneumonia.     Dictated by (CST) Hyponatremia  Gentle hydration and monitor       Pneumonia due to COVID-19 virus  IV antibiotics, pulmonary follow-up     Neuropathy–gabapentin     Hypertension–lisinopril     IDDM with complications–Levemir, NovoLog sliding scale     Dyslipidemia–statin

## 2021-03-15 NOTE — PROGRESS NOTES
COVID-19 Daily Discharge Readiness-Nursing    O2 Sat at Rest:  93% on 15L  O2 Sat with Exertion: 87% on 15L HFNC with movement in the bed  Temperature max from last 24 hrs: Temp (24hrs), Av.1 °F (36.7 °C), Min:97.8 °F (36.6 °C), Max:98.4 °F (36.9 °C)

## 2021-03-15 NOTE — CM/SW NOTE
Care Progression Note:  Active Acute Medical Issue:   Hypoxemia , Pneumonia due to COVID-19 virus    Other Contributing Medical Factors/Dx.:   DM, RACHELLE and CPAP, HTN, Neuropathy     Length of stay: 5  GMLOS: 4.0  Avoidable Delays: None  Discharge Barriers:

## 2021-03-15 NOTE — PROGRESS NOTES
[facility]                INFECTIOUS DISEASE PROGRESS NOTE    Fior Payan Patient Status:  Inpatient    1945 MRN [id number]   [facility] 5NW-A Attending Alina Contreras MD   Hosp Day # 5 PCP Lan Harding MD     Ant 03/13/21  0655 03/14/21  0631 03/15/21  0539   * 284* 134*   BUN 41* 46* 48*   CREATSERUM 1.43* 1.38* 1.32*   GFRAA 55* 57* 60   GFRNAA 47* 49* 52*   CA 8.2* 8.0* 8.4*   ALB 2.5* 2.5* 2.6*   * 133* 133*   K 4.6 4.6 4.3    102 101   CO2 2

## 2021-03-15 NOTE — PROGRESS NOTES
[facility] Pulmonary/Critical Care Progress Note     SUBJECTIVE/Interval history: All events, procedures, notes reviewed. Pt denies sob or cp.no cough.      Review of Systems:   A comprehensive 14 point review of systems 101 102 101   CO2 22.0 23.0 23.0     Recent Labs   Lab 03/13/21  0655 03/14/21  0631 03/15/21  0539   RBC 4.92 4.87 5.12   HGB 11.1* 11.0* 11.6*   HCT 36.2* 35.7* 38.6*   MCV 73.6* 73.3* 75.4*   MCH 22.6* 22.6* 22.7*   MCHC 30.7* 30.8* 30.1*   RDW 13.7 13. infection, with pulmonary edema or other etiologies not entirely excluded. Clinical correlation and follow-up suggested. 3. There is thinning of the apical wall of the left ventricle which may be due to an old infarct.   This could be further evaluated wi Tartrate    ASSESSMENT     Assessment:  · Acute hypoxic respiratory failure  · COVID 19 pneumonia  · Elevated inflammatory markers  · CKD  · HTN  · Hx RACHELLE  · Hyponatremia  · Microcytic anemia    PLAN    · CTA PE protocol with no PE. covid infiltrates. Jazmin Doe

## 2021-03-15 NOTE — PAYOR COMM NOTE
--------------  ADMISSION REVIEW     Payor: Andressamilagros Alexanderbenton Espino [address] #:  [id number]  Authorization Number: [id number]    Admit date: 3/10/21  Admit time:  8:16 PM       Admitting Physician: Blair Brito MD  Attending Physician:   Milena Tang MD use: Not on file    Drug use: Not on file             Review of Systems    Positive for stated complaint: pos covid here forl antibodies  Other systems are as noted in HPI. Constitutional and vital signs reviewed.       All other systems reviewed and negat components:    Procalcitonin 0.23 (*)     All other components within normal limits    Narrative:     Resulted by: batch: K, PBNP, CTNI, CRP, CK, FERR, LDH, CO2, CL, NA, ALB, TP, TBIL, ALT, AST, ALP, CA, CREA, BUN, GLUC,    URINALYSIS WITH CULTURE REFLEX - transcribed by Technologist)  Patient is COVID+ with worsening symptoms. FINDINGS:  There is diffuse airspace disease, most notable within the mid and lower lungs, consistent with multifocal COVID-19 pneumonia. Mildly enlarged cardiac silhouette.   No s [id number]   [facility] 5NW-A Attending Gloria Dangelo MD   Hosp Day # 1 PCP Esvin Stewart MD     Date:  3/11/2021  Date of Admission:  3/10/2021    History provided by:patient/ ER MD/NOTES/ NH NOTES  Chief Complaint:   Patient presents wi Take 50 mg by mouth 2 (two) times daily. glipiZIDE 10 MG Oral Tab, Take 10 mg by mouth 2 (two) times daily before meals. lisinopril 2.5 MG Oral Tab, Take 2.5 mg by mouth daily.   Pioglitazone HCl-Metformin HCl (ACTOPLUS MET XR OR), Take 30 mg by mouth jason (L) 03/11/2021    K 4.2 03/11/2021    CL 99 03/11/2021    CO2 24.0 03/11/2021     (H) 03/11/2021    CA 7.9 (L) 03/11/2021    ALB 2.7 (L) 03/11/2021    ALKPHO 46 03/11/2021    BILT 0.2 03/11/2021    TP 7.4 03/11/2021    AST 54 (H) 03/11/2021    ALT 3 of breath, positive chest x-ray showing bilateral infiltrates with hypoxia, oxygenation, remdesivir, Decadron, possible CCP but defer to ID      Hyponatremia  Gentle hydration and monitor      Pneumonia due to COVID-19 virus  IV antibiotics, pulmonary foll furosemide (LASIX) injection 20 mg     Date Action Dose Route User    3/14/2021 1233 Given 20 mg Intravenous Lopez Wilhelm RN      gabapentin (NEURONTIN) cap 100 mg     Date Action Dose Route User    3/14/2021 2102 Given 100 mg Oral Candace Hatchet [facility] [[id number]] ordered by Cassi Hua MD at 03/10/21 1916                  Signed                Expand AllCollapse All                      Expand widget buttonCollapse widget button                                  customization regina Left Ankle surgery 11/2010            No family history on file. reports that he has never smoked.  He has never used smokeless tobacco.              Allergies:    No Known Allergies         Medications:         Current Facility-Administered mouth daily. , Disp: , Rfl:     Pioglitazone HCl-Metformin HCl (ACTOPLUS MET XR OR), Take 30 mg by mouth daily.   , Disp: , Rfl:     Cyanocobalamin (B-12) 1000 MCG Oral Tab CR, 1 TABLET DAILY, Disp: , Rfl:     ASA-APAP-Salicyl-Caff Oral Tab, 81 MG Baby Aspir 0.4     0.2       TP     8.4*     7.4                              Lab Results       Component     Value     Date             DDIMER     1.06     03/10/2021             CRP     5.01     03/10/2021             TROP     <0.045     03/11/2021             CK 3/10/2021                            Detailed Report                        Note shared with patient              Chart Review: Note Routing History      No routing history on file.         Galo Small MD   Physician   Pulmonology        Consults      A •     Muscle weakness             •     Osteoarthritis             •     Sleep apnea                          Past Surgical History:       Procedure     Laterality     Date       •     BACK SURGERY                         04/03/2006 Spinal patricia congestion, snoring, sore throat, hoarseness and voice change. Respiratory: Negative for cough, sputum, hemoptysis, chest pain, wheezing, dyspnea on exertion, or stridor.     Cardiovascular: Negative for chest pain, palpitations, irregular heart beats, s 144/83     —     —     108     (!) 35     93 %     —     —       03/10/21 1930     137/84     —     —     84     (!) 30     94 %     —     —       03/10/21 1759     —     —     —     —     —     97 %     —     —       03/10/21 1756     151/77     100.1 °F 3.2*       PLT     194.0     167.0            No results for input(s): BNP in the last 168 hours.            Recent Labs       Lab     03/10/21    1805     03/11/21    0025       TROP     <0.045     <0.045       CK     276      --             No results remdesivir day 2      •Cont dex day 2      •Continue albuterol      •Trend inflammatory markers/d dimer      •Check tsh      •Monitor BS      •Should have outpt eval of microcytic anemia (colonoscopy) if not done      •Cr baseline 1.3. given proteinuria, l PNA. Hx of obtained from chart review. Pt has uncontrolled T2DM with A1c level 7.9%. He takes acarbose 50 mg TID, glipizide 10 mg BID, pioglitazone-metformin 30 mg + unclear metformin dose daily.  He has received IV dexamethasone 10 mg yesterday pedro Aspart Pen (NOVOLOG) 100 UNIT/ML flexpen 1-30 Units, 1-30 Units, Subcutaneous, TID CC    •  insulin detemir (LEVEMIR) 100 UNIT/ML flextouch 18 Units, 18 Units, Subcutaneous, Daily    •  gabapentin (NEURONTIN) cap 100 mg, 100 mg, Oral, BID    •  acetaminoph 140 QID          2. COVID PNA - per primary service, pulmonology and ID    3. Hyperlipidemia - statin         As of today, March 11, Cyndie 2 endocrinologists are taking a leave of absence from rounding at [facility].  We will defer further manageme ()/(40-62) 119/62    HEENT: 02 NC    Neck: supple no masses    Respiratory: Non labored, symmetric excursion, normal respirations    Cardiovascular: no irregularities in rhythm    Abdomen: Soft, nontender, nondistended.      Musculoskeletal: joints: n 100     99     103       CO2     24.0     24.0     26.0       ALKPHO     50     46     50       AST     66*     54*     103*       ALT     39     38     63*       BILT     0.4     0.2     0.2       TP     8.4*     7.4     7.2                 No results Vitals                                                                                              Electronically signed by Corbin Suarez MD at 3/12/2021 4:14 PM           ED to Hosp-Admission (Current) on 3/10/2021   Detailed Report   Note shared with results found for: COVID19         Pro-Calcitonin           Recent Labs       Lab     03/10/21    1805     03/12/21    1117       PCT     0.23*     0.23*                 Cardiac            Recent Labs       Lab     03/10/21    1805 03/11/21    0025 103*     86*       ALT     38     63*     85*       BILT     0.2     0.2     0.3       TP     7.4     7.2     6.9                 No results found for: [facility]    Microbiology         Recent Results Worsening hypoxia this am when getting up to bedside commode. Increased from 8L to 15L, sats in mid 80s. Recovered slowly and now back to 8L.          Objective:    Blood pressure 134/61, pulse 82, temperature 97.8 °F (36.6 °C), temperature source Oral, r No results found for: PHOS, PHOSPHORUS          No results for input(s): PT, INR, PTT in the last 168 hours. No results for input(s): ABGPHT, EHGRGH0L, CMGKJ4F, ABGHCO3, ABGBE, TEMP, TRACIE, SITE, DEV, THGB in the last 168 hours.          Invalid inp (ROCEPHIN) 1 g in sodium chloride 0.9% 100 mL IVPB-ADDV, 1 g, Intravenous, Q12H    Albuterol Sulfate  (90 Base) MCG/ACT inhaler 4 puff, 4 puff, Inhalation, Q6H WA    remdesivir 100 mg in sodium chloride 0.9% 270 mL IVPB, 100 mg, Intravenous, Q24H (translating)         Liam Durant MD, [place] [address] [facility]                Electronically signed by Gary Caldwell MD at 3/13/2021 12:58 PM                                         ED to Hosp-Admission (Current) on no rebound, positive BS    Extremity: no lower extremity edema, no cyanosis    Neurological: alert, interactive, no focal deficits         Lab Data Review:            Recent Labs       Lab     03/12/21    0527     03/13/21    0655     03/14/21    0624 Recent Labs       Lab     03/10/21    1805     03/12/21    1117       CK     276     197                 Inflammatory Markers              Recent Labs       Lab     03/12/21    0524     03/12/21    0534     03/13/21    0654     03/13/21    0655     0 Insulin Aspart Pen (NOVOLOG) 100 UNIT/ML flexpen 1-40 Units, 1-40 Units, Subcutaneous, TID CC and HS    Metoprolol Tartrate (LOPRESSOR) tab 50 mg, 50 mg, Oral, 2x Daily(Beta Blocker)    lisinopril tab 2.5 mg, 2.5 mg, Oral, Daily    Rosuvastatin Calcium (C Expand widget buttonCollapse widget button                                  customization button                                                                                                          [id number]     03/14/21    0631       CRP      --      2.48*      --      1.18*     0.70*       RELL      --      798.8*      --      682.9*     457.5       LDH      --      546*      --      618*     637*       DDIMER     0.62      --      0.98*      --      1 decadron, Toci added    Continue Remdesivir up to 5 days    PCT slightly elevated, was started on ceftriaxone              Jeremías Man MD    [facility]    [phone]                     Electronically signed by Theo [place]

## 2021-03-16 NOTE — PROGRESS NOTES
[facility]                INFECTIOUS DISEASE PROGRESS NOTE    Perfecto Massey Patient Status:  Inpatient    1945 MRN [id number]   [facility] 5NW-A Attending Karthik Keller MD   Hosp Day # 6 PCP Asia Morejon MD     Ant 46* 48* 59*   CREATSERUM 1.38* 1.32* 1.57*   GFRAA 57* 60 49*   GFRNAA 49* 52* 42*   CA 8.0* 8.4* 8.6   ALB 2.5* 2.6* 2.8*   * 133* 129*   K 4.6 4.3 4.3    101 96*   CO2 23.0 23.0 24.0   ALKPHO 77 92 108   AST 55* 44* 57*   ALT 70* 61 59   BILT

## 2021-03-16 NOTE — COVID NURSING ASSESSMENT
COVID-19 Daily Discharge Readiness-Nursing    O2 Sat at Rest:  91 % 9L  O2 Sat with Exertion: 88  % on 15  liters   Temperature max from last 24 hrs: Temp (24hrs), Av °F (36.7 °C), Min:97.7 °F (36.5 °C), Max:98.5 °F (36.9 °C)    Inflammatory Markers:

## 2021-03-16 NOTE — CONSULTS
[facility]  Report of Consultation    Narendra Reyes Patient Status:  Inpatient    1945 MRN [id number]   [facility] 5NW-A Attending Caity Thompson MD   Hosp Day # 6 PCP Taco Miguel MD     Reason for Consultation:  Tracy Zhong PRN  •  guaiFENesin ER (MUCINEX) 12 hr tab 600 mg, 600 mg, Oral, BID  •  Albuterol Sulfate  (90 Base) MCG/ACT inhaler 4 puff, 4 puff, Inhalation, Q4H PRN  •  zinc sulfate (ZINCATE) cap 220 mg, 220 mg, Oral, BID  •  cholecalciferol (VITAMIN D3) cap/t swelling noted. Integument: No lesions. No erythema. Psychiatric: Appropriate mood and affect.     Laboratory:  Lab Results   Component Value Date    WBC 9.6 03/16/2021    HGB 13.4 03/16/2021    HCT 42.5 03/16/2021    .0 03/16/2021    CREATSERUM 1

## 2021-03-16 NOTE — COVID NURSING ASSESSMENT
COVID-19 Daily Discharge Readiness-Nursing    O2 Sat at Rest:  92% on 13L  O2 Sat with Exertion: 86% on 15L  Temperature max from last 24 hrs: Temp (24hrs), Av °F (36.7 °C), Min:97.9 °F (36.6 °C), Max:98.3 °F (36.8 °C)    Inflammatory Markers:   Recent

## 2021-03-16 NOTE — PROGRESS NOTES
[facility] Pulmonary/Critical Care Progress Note     SUBJECTIVE/Interval history: All events, procedures, notes reviewed. Pt is not sob but is hypoxic on 14L and required nrb overnight. Denies cp or sob.      Review of S CA 8.0* 8.4* 8.6   * 133* 129*   K 4.6 4.3 4.3    101 96*   CO2 23.0 23.0 24.0     Recent Labs   Lab 03/14/21  0631 03/15/21  0539 03/16/21  0539   RBC 4.87 5.12 5.88*   HGB 11.0* 11.6* 13.4   HCT 35.7* 38.6* 42.5   MCV 73.3* 75.4* 72.3*   MC Clinical correlation and follow-up suggested. 3. There is thinning of the apical wall of the left ventricle which may be due to an old infarct. This could be further evaluated with echocardiogram as clinically directed.   Please see above for further deta more hypoxic then start vapotherm and may need icu transfer  · Empiric CTX day 3/5 for elevated PCT. Now stopped. ID following.  Recheck procal  · Increase lovenox with increasing D-dimer.  inflammatory markers stable but D Dimer rising  · Increase albutero

## 2021-03-16 NOTE — PROGRESS NOTES
[facility]  Progress Note    Fior Payan Patient Status:  Inpatient    1945 MRN [id number]   [facility] 5NW-A Attending Alina Contreras MD   Hosp Day # 6 PCP Lan Harding MD         SUBJECTIVE:  Subjective:  Venita Woods Recent Labs   Lab 03/10/21  1805 03/12/21  0534 03/13/21  0655 03/14/21  0631 03/15/21  0539 03/16/21  0539   ALT 39 63* 85* 70* 61 59   AST 66* 103* 86* 55* 44* 57*   ALB 3.2* 2.6* 2.5* 2.5* 2.6* 2.8*   * 546* 618* 637*  --   --        Recent and lower lungs, consistent with multifocal COVID-19 pneumonia. Mildly enlarged cardiac silhouette. No significant pleural fluid or pneumothorax. CONCLUSION:  Imaging findings are consistent with multifocal COVID-19 pneumonia.     Dictated by ( continue to trend inflammatory markers        Hyponatremia/PIETRO  Gentle hydration and monitor  -sodium and Cr worsening, nephro consult placed, may be due to overdiuresis        Pneumonia due to COVID-19 virus  Off IV antibiotics now, pulmonary follow-up

## 2021-03-17 NOTE — PAYOR COMM NOTE
--------------  CONTINUED STAY REVIEW    Payor: Kishor Alma #:  [id number]  Authorization Number: [id number]    Admit date: 3/10/21  Admit time:  8:16 PM    Admitting Physician: Caity Dan MD  Attending Physician:   Favian Penny MD Daniele Piña RN    3/16/2021 1841 Given 4 Units Subcutaneous (Left Upper Arm) Eliot Woods RN    3/16/2021 1337 Given 9 Units Subcutaneous (Right Upper Arm) Abbey Goodman, RUTH      Insulin Aspart Pen (NOVOLOG) 100 UNIT/ML flexpen 1-30 Units     Date Actio 20  BP: (120-132)/(54-66) 127/66     Intake/Output:     Intake/Output Summary (Last 24 hours) at 3/15/2021 1003  Last data filed at 3/15/2021 0511      Gross per 24 hour   Intake 240 ml   Output 1400 ml   Net -1160 ml         Exam  General Appearance:    A Remdesivir#4   Decadron   Toci x1   Subjective:   Resting in bed on 15L   Objective:   Temp: [97.5 °F (36.4 °C)-98.4 °F (36.9 °C)] 97.9 °F (36.6 °C)   Pulse: [69-90] 71   Resp: [15-20] 20   BP: (120-132)/(54-66) 127/66   General: awake resting   Vital sign  102 101   CO2 22.0 23.0 23.0   ALKPHO 67 77 92   AST 86* 55* 44*   ALT 85* 70* 61   BILT 0.3 0.3 0.4   TP 6.9 6.7 6.8        ASSESSMENT/PLAN:  1.  COVID-19 pneumonia  -02 requirements on 15L  On decadron, Toci added  Complete 5 days of Remdesivir I/O even to negative, avoid IVF if able in COVID infection  · Prophylaxis- lovenox increase to 0.5mg/kg bid with increasing D-dimer.   · D/w rn    3/16 ID NOTE   Antibiotics: Remdesivir completed  Decadron  Toci x1     Subjective:  Resting in bed on 14L    management,   Hypoxemia  Due to COVID-19, oxygenation, medical management, pulmonary and ID follow-up  -CTA PE protocol negative for PE        COVID-19  Apparently multiple family members at home that were positive last week, admitted with fever, shortness negative, avoid IVF if able in COVID infection  · Prophylaxis- lovenox increase to 0.5mg/kg bid with increasing D-dimer.   · D/w rn     3/16 NEPHROLOGY CONSULT NOTE   Reason for Consultation:  Hyponatremia/CKD     History of Present Illness:  Amina Bloodgood

## 2021-03-17 NOTE — DIETARY NOTE
[address] [place] [address]     Admitting diagnosis:  Hypoxemia [R09.02]  Hyponatremia [E87.1]  Pneumonia due to COVID-19 virus [U07.1, J12.82]  COVID-19 [U07.1]    Ht: 165.1 cm (5' 5\")  Wt: 64.7 kg (142 lb 11.2 oz).    Body m

## 2021-03-17 NOTE — PROGRESS NOTES
[facility]                INFECTIOUS DISEASE PROGRESS NOTE    Jacquie Valdez Patient Status:  Inpatient    1945 MRN [id number]   [facility] 5NW-A Attending Johnathan Coughlin MD   Hosp Day # 7 PCP Nathalie Munguia MD     Ant 43.3   MCV 73.8*   < > 72.3*   MCH 22.7*   < > 22.9*   MCHC 30.8*   < > 31.6   RDW 13.7   < > 13.3   NEPRELIM 7.23   < > 10.03*   WBC 8.2   < > 11.0   .0   < > 364.0   NEUT 84  --   --    LYMPH 7  --   --    MON 0  --   --    EOS 1  --   --     < >

## 2021-03-17 NOTE — PROGRESS NOTES
[facility]  Progress Note    Dimas Jimenez Patient Status:  Inpatient    1945 MRN [id number]   [facility] 5NW-A Attending Leonardo Kaufman MD   Hosp Day # 7 PCP Aleks Rizvi MD         SUBJECTIVE:  Subjective:  Rosaura Bermudez CO2 22.0 23.0 23.0 24.0 26.0   BUN 41* 46* 48* 59* 73*   CREATSERUM 1.43* 1.38* 1.32* 1.57* 1.49*   CA 8.2* 8.0* 8.4* 8.6 8.2*   * 284* 134* 188* 180*       Recent Labs   Lab 03/10/21  1805 03/11/21  0433 03/12/21  0534 03/12/21  0534 03/13/21  06 radiograph was obtained. COMPARISON:  [facility] , XR, CHEST PA   LATERAL, 7/11/2005, 11:18 AM.  INDICATIONS:  pos covid here forl antibodies  PATIENT STATED HISTORY: (As transcribed by Technologist)  Patient is COVID+ with worsening symptoms.     FINDINGS:  T chronic kidney disease    Type 2 diabetes mellitus with stage 3 chronic kidney disease ([facility] [address]          Plan:  Continue present management,   Hypoxemia  Due to COVID-19, oxygenation, medical management, pulmonary and ID follow-up  -CTA PE protocol negative f

## 2021-03-17 NOTE — COVID NURSING ASSESSMENT
COVID-19 Daily Discharge Readiness-Nursing    O2 Sat at Rest:  94  % 11L  O2 Sat with Exertion: 88% 15L  Temperature max from last 24 hrs: Temp (24hrs), Av.1 °F (36.7 °C), Min:97.8 °F (36.6 °C), Max:98.5 °F (36.9 °C)    Inflammatory Markers:   Recent L

## 2021-03-17 NOTE — COVID NURSING ASSESSMENT
COVID-19 Daily Discharge Readiness-Nursing    O2 Sat at Rest:  92% on 13L  Temperature max from last 24 hrs: Temp (24hrs), Av °F (36.7 °C), Min:97.7 °F (36.5 °C), Max:98.5 °F (36.9 °C)    Inflammatory Markers:   Recent Labs   Lab 21  0631 03/15/2

## 2021-03-17 NOTE — PROGRESS NOTES
[facility]  Progress Note    Jem Goldberg Patient Status:  Inpatient    1945 MRN [id number]   [facility] 5NW-A Attending Ryley Reed MD   Hosp Day # 7 PCP Phi Hu MD       On non-rebreather mask this am  Not eat (64.7 kg), SpO2 95 %. General: awake;alert; fatigued; mask in place  HEENT: Moist mucous membranes. EOM-I. PERRL  Neck: No lymphadenopathy. No JVD. No carotid bruits.   Respiratory: decreased breath sounds B  Cardiovascular: S1, S2.  Regular rate and rhyt

## 2021-03-17 NOTE — PROGRESS NOTES
[facility] Pulmonary/Critical Care Progress Note     SUBJECTIVE/Interval history: All events, procedures, notes reviewed. Pt laying half proned.  He had episode of hypoxia again overnight requiring nrb and now back to 11  96* 98   CO2 23.0 24.0 26.0     Recent Labs   Lab 03/15/21  0539 03/16/21  0539 03/17/21  0614   RBC 5.12 5.88* 5.99*   HGB 11.6* 13.4 13.7   HCT 38.6* 42.5 43.3   MCV 75.4* 72.3* 72.3*   MCH 22.7* 22.8* 22.9*   MCHC 30.1* 31.5 31.6   RDW 13.4 13. pulmonary edema or other etiologies not entirely excluded. Clinical correlation and follow-up suggested. 3. There is thinning of the apical wall of the left ventricle which may be due to an old infarct.   This could be further evaluated with echocardiogra proning. If becomes more hypoxic then start vapotherm and may need icu transfer  · Empiric CTX day 3/5 for elevated PCT. Now stopped. ID following.  Recheck procal is neg 0.09  · Will start iv NAC for severe covid pna loading dose 150 mg/kg followed by 50 m

## 2021-03-17 NOTE — PLAN OF CARE
Problem: Diabetes/Glucose Control  Goal: Glucose maintained within prescribed range  Description: INTERVENTIONS:  - Monitor Blood Glucose as ordered  - Assess for signs and symptoms of hyperglycemia and hypoglycemia  - Administer ordered medications to m support as indicated  - Manage/alleviate anxiety  - Monitor for signs/symptoms of CO2 retention  Outcome: Progressing

## 2021-03-18 NOTE — PROGRESS NOTES
[facility]                INFECTIOUS DISEASE PROGRESS NOTE    Janell Oleary Patient Status:  Inpatient    1945 MRN [id number]   [facility] 5NW-A Attending Cosme Mckeon MD   Hosp Day # 8 PCP Massiel Rausch MD     Ant < > = values in this interval not displayed.          Recent Labs   Lab 03/16/21  0539 03/17/21  0620 03/18/21  0444   * 180* 99   BUN 59* 73* 61*   CREATSERUM 1.57* 1.49* 1.40*   GFRAA 49* 52* 56*   GFRNAA 42* 45* 48*   CA 8.6 8.2* 8.8   ALB 2.8*

## 2021-03-18 NOTE — PROGRESS NOTES
[facility]  Progress Note    Melanie Carter Patient Status:  Inpatient    1945 MRN [id number]   [facility] 5NW-A Attending Mary Arteaga MD   Hosp Day # 8 PCP Rony Guillen MD         SUBJECTIVE:  Subjective:  Jerica Ames 48* 59* 73* 61*   CREATSERUM 1.38* 1.32* 1.57* 1.49* 1.40*   CA 8.0* 8.4* 8.6 8.2* 8.8   MG  --   --   --   --  2.7*   * 134* 188* 180* 99       Recent Labs   Lab 03/12/21  0534 03/12/21  0534 03/13/21  0655 03/13/21  0655 03/14/21  0631 03/15/21  0 obtained. COMPARISON:  [facility] , XR, CHEST PA   LATERAL, 7/11/2005, 11:18 AM.  INDICATIONS:  pos covid here forl antibodies  PATIENT STATED HISTORY: (As transcribed by Technologist)  Patient is COVID+ with worsening symptoms.     FINDINGS:  There is diffuse Hyponatremia    Pneumonia due to COVID-19 virus    Stage 3 chronic kidney disease    Type 2 diabetes mellitus with stage 3 chronic kidney disease ([facility] [address]          Plan:  Continue present management,   Hypoxemia  Due to COVID-19, oxygenation, medical manageme

## 2021-03-18 NOTE — PROGRESS NOTES
[facility] Pulmonary/Critical Care Progress Note     SUBJECTIVE/Interval history: All events, procedures, notes reviewed. Pt laying half proned. Remains on varying amounts of O2.       Review of Systems:   A comprehensiv 98 100   CO2 24.0 26.0 22.0     Recent Labs   Lab 03/16/21  0539 03/17/21  0614 03/18/21  0444   RBC 5.88* 5.99* 6.61*   HGB 13.4 13.7 14.8   HCT 42.5 43.3 47.2   MCV 72.3* 72.3* 71.4*   MCH 22.8* 22.9* 22.4*   MCHC 31.5 31.6 31.4   RDW 13.5 13.3 15.5*   N infarct. This could be further evaluated with echocardiogram as clinically directed. Please see above for further details.    Dictated by (CST): Mojgan Traylor MD on 3/15/2021 at 1:25 PM     Finalized by (CST): Mojgan Traylor MD on 3/15/2021 at 1:30 PM increasing D-dimer.  inflammatory markers stable but D Dimer improving today  · Increase albuterol to q4 scheduled  · Monitor blood sugars on dexamethasone  · Monitor volumes status, I/O even to negative, avoid IVF if able in COVID infection  · Prophylaxis

## 2021-03-18 NOTE — COVID NURSING ASSESSMENT
COVID-19 Daily Discharge Readiness-Nursing    O2 Sat at Rest:  SPO2% on Room Air at Rest: 87  %   O2 Sat with Exertion: SPO2% Ambulation on Oxygen: 90  % on Ambulation oxygen flow (liters per minute): 15  liters   Temperature max from last 24 hrs: Temp (24

## 2021-03-18 NOTE — PROGRESS NOTES
[facility]  Progress Note    Jem Goldberg Patient Status:  Inpatient    1945 MRN [id number]   [facility] 5NW-A Attending Ryley Reed MD   Hosp Day # 8 PCP Phi Hu MD     On 9 L NC  States he feels better today Systems:  See HPI; A total of 12 systems reviewed and otherwise unremarkable. Physical Exam:  Vital signs: Blood pressure 118/72, pulse 88, temperature 98.5 °F (36.9 °C), temperature source Oral, resp. rate 18, height 5' 5\" (1.651 m), weight 142 lb 11.

## 2021-03-18 NOTE — PROGRESS NOTES
COVID-19 Daily Discharge Readiness-Nursing    O2 Sat at Rest:  94% on 9L HFNC   O2 Sat with Exertion: SPO2% Ambulation on Oxygen: 90  % on Ambulation oxygen flow (liters per minute): 15  liters   Temperature max from last 24 hrs: Temp (24hrs), Av.1 °F

## 2021-03-18 NOTE — PLAN OF CARE
Problem: Diabetes/Glucose Control  Goal: Glucose maintained within prescribed range  Description: INTERVENTIONS:  - Monitor Blood Glucose as ordered  - Assess for signs and symptoms of hyperglycemia and hypoglycemia  - Administer ordered medications to m perform as needed  - Assess and instruct to report SOB or any respiratory difficulty  - Respiratory Therapy support as indicated  - Manage/alleviate anxiety  - Monitor for signs/symptoms of CO2 retention  Outcome: Progressing

## 2021-03-19 NOTE — PROGRESS NOTES
[facility]                INFECTIOUS DISEASE PROGRESS NOTE    Jem Goldberg Patient Status:  Inpatient    1945 MRN [id number]   [facility] 5NW-A Attending Kay Klein MD   Hosp Day # 9 PCP Phi Hu MD     Ant 101   CO2 26.0 22.0 25.0   ALKPHO 110 118* 103   AST 48* 55* 56*   ALT 51 46 40   BILT 0.6 0.6 0.7   TP 7.1 7.4 6.7       No results found for: [facility] Encounter on 03/10/21   1.  BLOOD CULTURE     Status: None    Collection Time: 03

## 2021-03-19 NOTE — COVID NURSING ASSESSMENT
COVID-19 Daily Discharge Readiness-Nursing    Inflammatory Markers:   Recent Labs   Lab 03/17/21  0620 03/17/21  0620 03/18/21  0444 03/18/21  1055   CRP <0.29   < > <0.29 <0.29   RELL 501.6  --  672.2*  --    DDIMER  --    < > 3.32* 2.80*    < > = values i

## 2021-03-19 NOTE — PROGRESS NOTES
[facility]  Progress Note    Narendra Reyes Patient Status:  Inpatient    1945 MRN [id number]   [facility] 5NW-A Attending Alirio Carroll MD   Hosp Day # 9 PCP Taco Miguel MD         SUBJECTIVE:  Subjective:  Ramon Payan sounds active all four quadrants,                Extremities:    no cyanosis, icterus or edema                                  Neurologic :  General weakness, no focal deficits                                      Data Review:       Labs:     Recent Labs transcribed by Technologist)  Patient offered no additional history at this time. FINDINGS:  Patient is tilted towards the left. Cardiomediastinal silhouette stable.   Persistent diffuse increased interstitial and small patches of airspace opacities wit BID   • Insulin Aspart Pen  1-40 Units Subcutaneous TID CC and HS   • metoprolol Tartrate  50 mg Oral 2x Daily(Beta Blocker)   • Rosuvastatin Calcium  20 mg Oral Nightly   • aspirin  81 mg Oral Daily       Saline Nasal Spray, sodium chloride 0.9%, Albutero steroids completed    Dyslipidemia–statin     Leukopenia due to COVID-19–monitor counts     Anemia–anemia of chronic disease     DVT prophylaxis–subcu Lovenox bid      See tests ordered,  Available and radiology reviewed  All consultant notes reviewed  Dis

## 2021-03-19 NOTE — PROGRESS NOTES
[facility] Pulmonary/Critical Care Progress Note     SUBJECTIVE/Interval history: All events, procedures, notes reviewed. No acute events overnight, remains on 12-15L, not proning, may lay on side. Denies pain.  No fever 47.2 43.6   MCV 72.3* 71.4* 71.4*   MCH 22.9* 22.4* 22.6*   MCHC 31.6 31.4 31.7   RDW 13.3 15.5* 14.5   NEPRELIM 10.03* 11.88* 12.84*   WBC 11.0 12.9* 13.7*   .0 439.0 352.0     No results for input(s): BNP in the last 168 hours.   No results for inp Calcium  20 mg Oral Nightly   • aspirin  81 mg Oral Daily     Saline Nasal Spray, sodium chloride 0.9%, Albuterol Sulfate HFA, acetaminophen, ondansetron HCl, metoprolol Tartrate    ASSESSMENT  · Acute hypoxic respiratory failure due to COVID pneumonia  ·

## 2021-03-19 NOTE — PLAN OF CARE
Problem: Diabetes/Glucose Control  Goal: Glucose maintained within prescribed range  Description: INTERVENTIONS:  - Monitor Blood Glucose as ordered  - Assess for signs and symptoms of hyperglycemia and hypoglycemia  - Administer ordered medications to m suctioning and perform as needed  - Assess and instruct to report SOB or any respiratory difficulty  - Respiratory Therapy support as indicated  - Manage/alleviate anxiety  - Monitor for signs/symptoms of CO2 retention  Outcome: Progressing

## 2021-03-19 NOTE — PROGRESS NOTES
[facility]  Progress Note    Cleaster Messing Patient Status:  Inpatient    1945 MRN [id number]   [facility] 5NW-A Attending Brinda Koch MD   Hosp Day # 9 PCP Guillermina Perry MD     No acute events  Chart reviewed      insu signs: Blood pressure 125/72, pulse 80, temperature 97.8 °F (36.6 °C), temperature source Oral, resp. rate 18, height 5' 5\" (1.651 m), weight 142 lb 11.2 oz (64.7 kg), SpO2 95 %. General: awake;alert;    HEENT: Moist mucous membranes. EOM-I.  PERRL  Neck:

## 2021-03-20 NOTE — PROGRESS NOTES
Pt tolerated moving to chair from bed on 15L without desaturating. Pt currently 97% on 6L sitting up in chair. Daughter updated.

## 2021-03-20 NOTE — COVID NURSING ASSESSMENT
COVID-19 Daily Discharge Readiness-Nursing    O2 Sat at Rest:  95% on 9L o2 hfnc  Temperature max from last 24 hrs: Temp (24hrs), Av.8 °F (36.6 °C), Min:97.4 °F (36.3 °C), Max:98.1 °F (36.7 °C)    Inflammatory Markers:   Recent Labs   Lab 21  044

## 2021-03-20 NOTE — PROGRESS NOTES
[facility]                INFECTIOUS DISEASE PROGRESS NOTE    Melanie Carter Patient Status:  Inpatient    1945 MRN [id number]   [facility] 5NW-A Attending Mary Arteaga MD   Hosp Day # 10 PCP Rony Guillen MD     An 8.8 8.1*   ALB 2.7* 2.7* 2.6*   * 131* 132*   K 4.7 4.5 4.8   CL 98 100 101   CO2 26.0 22.0 25.0   ALKPHO 110 118* 103   AST 48* 55* 56*   ALT 51 46 40   BILT 0.6 0.6 0.7   TP 7.1 7.4 6.7       No results found for: [facility] En

## 2021-03-20 NOTE — PROGRESS NOTES
[facility]  Progress Note    Blank Sibley Patient Status:  Inpatient    1945 MRN [id number]   [facility] 5NW-A Attending Gloria Dangelo MD   Hosp Day # 10 PCP Esvin Stewart MD         SUBJECTIVE:  Subjective:  Maggie Cordoba Normocephalic,  atraumatic   Neck:   Supple, symmetrical, trachea midline,        thyroid:      no JVD   Lungs:     Clear to auscultation bilaterally, respirations unlabored       Heart:    Regular rate and rhythm, S1 and S2 normal,     Abdomen:     Soft, (CPT=71045)  TECHNIQUE:  AP chest radiograph was obtained.   COMPARISON:  [facility] , XR, XR CHEST AP PORTABLE  (CPT=71045), 3/10/2021, 6:18 PM.  INDICATIONS:  hypoxia  PATIENT STATED HISTORY: (As transcribed by Technologist)  Patient offered no additional his • guaiFENesin ER  600 mg Oral BID   • zinc sulfate  220 mg Oral BID   • ascorbic acid  1,000 mg Oral Daily   • Insulin Aspart Pen  1-30 Units Subcutaneous TID CC   • gabapentin  100 mg Oral BID   • Insulin Aspart Pen  1-40 Units Subcutaneous TID CC and H consultant notes reviewed  Discussed with nursing on floor  dvt prophylaxis reviewed  PT and/or OT  Jaswinder Ramos MD

## 2021-03-20 NOTE — COVID NURSING ASSESSMENT
COVID-19 Daily Discharge Readiness-Nursing    O2 Sat at Rest:  95% on 7L    Temperature max from last 24 hrs: Temp (24hrs), Av °F (36.7 °C), Min:97.4 °F (36.3 °C), Max:98.4 °F (36.9 °C)    Inflammatory Markers:   Recent Labs   Lab 21  0445 /

## 2021-03-20 NOTE — PROGRESS NOTES
[facility] Pulmonary/Critical Care Progress Note     SUBJECTIVE/Interval history: All events, procedures, notes reviewed. pt is down to 7 L o2 and no complaints. He is no longer desaturating while supine or eating.  Barry Barcenas CL 98 100 101   CO2 26.0 22.0 25.0     Recent Labs   Lab 03/17/21  0614 03/18/21  0444 03/19/21  0445   RBC 5.99* 6.61* 6.11*   HGB 13.7 14.8 13.8   HCT 43.3 47.2 43.6   MCV 72.3* 71.4* 71.4*   MCH 22.9* 22.4* 22.6*   MCHC 31.6 31.4 31.7   RDW 13.3 15.5* TID CC and HS   • metoprolol Tartrate  50 mg Oral 2x Daily(Beta Blocker)   • Rosuvastatin Calcium  20 mg Oral Nightly   • aspirin  81 mg Oral Daily     Saline Nasal Spray, sodium chloride 0.9%, Albuterol Sulfate HFA, acetaminophen, ondansetron HCl, metopro

## 2021-03-20 NOTE — PLAN OF CARE
Problem: Diabetes/Glucose Control  Goal: Glucose maintained within prescribed range  Description: INTERVENTIONS:  - Monitor Blood Glucose as ordered  - Assess for signs and symptoms of hyperglycemia and hypoglycemia  - Administer ordered medications to m cough, deep breathe, Incentive Spirometry  - Assess the need for suctioning and perform as needed  - Assess and instruct to report SOB or any respiratory difficulty  - Respiratory Therapy support as indicated  - Manage/alleviate anxiety  - Monitor for sign

## 2021-03-21 NOTE — PROGRESS NOTES
[facility]                INFECTIOUS DISEASE PROGRESS NOTE    Marilu Ennis Patient Status:  Inpatient    1945 MRN [id number]   [facility] 5NW-A Attending Razia Chung MD   Hosp Day # 11 PCP Angela Riojas MD     An 48*   ALT 46 40 35   BILT 0.6 0.7 0.8   TP 7.4 6.7 6.2*       No results found for: [address] Encounter on 03/10/21   1.  BLOOD CULTURE     Status: None    Collection Time: 03/10/21  6:09 PM    Specimen: Blood,peripheral   Result Value

## 2021-03-21 NOTE — PROGRESS NOTES
[facility]  Progress Note    Shen Mcarthur Patient Status:  Inpatient    1945 MRN [id number]   [facility] 5NW-A Attending Kevin Angel MD   Hosp Day # 11 PCP Remy Moya MD         SUBJECTIVE:  Subjective:  Nathan Bess bilaterally, respirations unlabored       Heart:    Regular rate and rhythm, S1 and S2 normal,     Abdomen:     Soft, non-tender, bowel sounds active all four quadrants,                Extremities:    no cyanosis, icterus or edema history at this time. FINDINGS:  Patient is tilted towards the left. Cardiomediastinal silhouette stable.   Persistent diffuse increased interstitial and small patches of airspace opacities without new focal consolidation, pleural effusion, or pneumotho Oral 2x Daily(Beta Blocker)   • Rosuvastatin Calcium  20 mg Oral Nightly   • aspirin  81 mg Oral Daily     • sodium chloride       Saline Nasal Spray, sodium chloride 0.9%, Albuterol Sulfate HFA, acetaminophen, ondansetron HCl, metoprolol Tartrate       As prophylaxis–subcu Lovenox bid      See tests ordered,  Available and radiology reviewed  All consultant notes reviewed  Discussed with nursing on floor  dvt prophylaxis reviewed  PT and/or OT  Manuelito Acosta MD

## 2021-03-21 NOTE — PLAN OF CARE
Problem: Diabetes/Glucose Control  Goal: Glucose maintained within prescribed range  Description: INTERVENTIONS:  - Monitor Blood Glucose as ordered  - Assess for signs and symptoms of hyperglycemia and hypoglycemia  - Administer ordered medications to m optimal ventilation and oxygenation  Description: INTERVENTIONS:  - Assess for changes in respiratory status  - Assess for changes in mentation and behavior  - Position to facilitate oxygenation and minimize respiratory effort  - Oxygen supplementation bas

## 2021-03-21 NOTE — COVID NURSING ASSESSMENT
COVID-19 Daily Discharge Readiness-Nursing    O2 Sat at Rest:  SPO2% on Room Air at Rest: 87  %   O2 Sat with Exertion: SPO2% Ambulation on Oxygen: 90  % on 6  liters   Temperature max from last 24 hrs: Temp (24hrs), Av °F (36.7 °C), Min:97.7 °F (36.5

## 2021-03-21 NOTE — PROGRESS NOTES
[facility] Pulmonary/Critical Care Progress Note     SUBJECTIVE/Interval history: All events, procedures, notes reviewed. Pt is requiring 4-6L O2 today. Denies sob or chest pain.      Review of Systems:   A comprehensive CA 8.8 8.1* 8.2*   * 132* 125*   K 4.5 4.8 4.3    101 95*   CO2 22.0 25.0 24.0     Recent Labs   Lab 03/18/21  0444 03/19/21  0445 03/21/21  0524   RBC 6.61* 6.11* 5.83*   HGB 14.8 13.8 13.2   HCT 47.2 43.6 41.2   MCV 71.4* 71.4* 70.7*   MCH Units Subcutaneous TID CC and HS   • metoprolol Tartrate  50 mg Oral 2x Daily(Beta Blocker)   • Rosuvastatin Calcium  20 mg Oral Nightly   • aspirin  81 mg Oral Daily     PEG 3350, Saline Nasal Spray, sodium chloride 0.9%, Albuterol Sulfate HFA, acetaminop

## 2021-03-21 NOTE — PLAN OF CARE
Problem: Diabetes/Glucose Control  Goal: Glucose maintained within prescribed range  Description: INTERVENTIONS:  - Monitor Blood Glucose as ordered  - Assess for signs and symptoms of hyperglycemia and hypoglycemia  - Administer ordered medications to m broncho-pulmonary hygiene including cough, deep breathe, Incentive Spirometry  - Assess the need for suctioning and perform as needed  - Assess and instruct to report SOB or any respiratory difficulty  - Respiratory Therapy support as indicated  - Manage/a

## 2021-03-21 NOTE — COVID NURSING ASSESSMENT
COVID-19 Daily Discharge Readiness-Nursing    O2 Sat at Rest: 93% on 6L hfnc     Temperature max from last 24 hrs: Temp (24hrs), Av.1 °F (36.7 °C), Min:97.7 °F (36.5 °C), Max:98.4 °F (36.9 °C)    Inflammatory Markers:   Recent Labs   Lab 21  5015

## 2021-03-21 NOTE — PROGRESS NOTES
[facility]  Progress Note    Guskvng Orlandobety Patient Status:  Inpatient    1945 MRN [id number]   [facility] 5NW-A Attending Sherine Colmenares MD   [facility] Day # 6 PCP Mariusz Bermeo MD     No acute events  Needing less O2  Eating (36.8 °C), temperature source Oral, resp. rate 18, height 5' 5\" (1.651 m), weight 142 lb 11.2 oz (64.7 kg), SpO2 95 %. General: awake;alert;    HEENT: Moist mucous membranes. EOM-I. PERRL  Neck: No lymphadenopathy. No JVD. No carotid bruits.   Respirator

## 2021-03-22 NOTE — COVID NURSING ASSESSMENT
COVID-19 Daily Discharge Readiness-Nursing    O2 Sat at Rest:  SPO2% on Room Air at Rest: 87  %   O2 Sat with Exertion: SPO2% Ambulation on Oxygen: 90  % on 6  liters   Temperature max from last 24 hrs: Temp (24hrs), Av.1 °F (36.7 °C), Min:97.7 °F (36.

## 2021-03-22 NOTE — PROGRESS NOTES
[facility]                INFECTIOUS DISEASE PROGRESS NOTE    Ernestina Acuna Patient Status:  Inpatient    1945 MRN [id number]   [facility] 5NW-A Attending Damaris Song MD   Hosp Day # 12 PCP Mega Dominguez MD     An 64   GFRNAA 62   < > 57* 45* 56*  56*   CA 8.1*   < > 8.2* 8.2* 8.0*  8.0*   ALB 2.6*  --  2.3*  --  2.3*   *   < > 125* 127* 128*  128*   K 4.8   < > 4.3 4.1 4.0  4.0      < > 95* 94* 96*  96*   CO2 25.0   < > 24.0 25.0 24.0  24.0   ALKPHO 103

## 2021-03-22 NOTE — CM/SW NOTE
Care Progression Note:  Active Acute Medical Issue:   68 y/o male with acute hypoxemic respiratory failure due to COVID-19 pneumonia, oxygen needs improving, currently on 4LHFNC, Vss  Hyponatremia, improving    Other Contributing Medical Factors/Dx.:   DM,

## 2021-03-22 NOTE — PLAN OF CARE
Problem: Diabetes/Glucose Control  Goal: Glucose maintained within prescribed range  Description: INTERVENTIONS:  - Monitor Blood Glucose as ordered  - Assess for signs and symptoms of hyperglycemia and hypoglycemia  - Administer ordered medications to m saturation or ABGs  - Provide Smoking Cessation handout, if applicable  - Encourage broncho-pulmonary hygiene including cough, deep breathe, Incentive Spirometry  - Assess the need for suctioning and perform as needed  - Assess and instruct to report SOB o

## 2021-03-22 NOTE — PROGRESS NOTES
[facility]  Progress Note    Janet Fails Patient Status:  Inpatient    1945 MRN [id number]   [facility] 5NW-A Attending Nikko Borja MD   Hosp Day # 12 PCP Bao Bermeo MD         SUBJECTIVE:  Subjective:  Lora Navarro 1.40* 1.14 1.23 1.48* 1.25  1.25   CA 8.8 8.1* 8.2* 8.2* 8.0*  8.0*   MG 2.7* 2.4  --   --   --    GLU 99 119* 83 202* 99  99       Recent Labs   Lab 03/17/21  0620 03/18/21  0444 03/19/21  0445 03/21/21  0524 03/22/21  0555   ALT 51 46 40 35 37   AST 48* worsening symptoms. FINDINGS:  There is diffuse airspace disease, most notable within the mid and lower lungs, consistent with multifocal COVID-19 pneumonia. Mildly enlarged cardiac silhouette. No significant pleural fluid or pneumothorax. [facility])          Plan:  Continue present management,   Hypoxemia  Due to COVID-19, oxygenation, medical management, pulmonary and ID follow-up  -CTA PE protocol negative for PE   -c/w supplemental o2, o2 status improved today       COVID-19  Remains on oxyge

## 2021-03-22 NOTE — PROGRESS NOTES
[facility] Pulmonary/Critical Care Progress Note     SUBJECTIVE/Interval history: All events, procedures, notes reviewed. pt denies cp or sob. Required 15 L O2 this am with getting up to commode.  Denies cp or sob or coug GFRNAA 57* 45* 56*  56*   CA 8.2* 8.2* 8.0*  8.0*   * 127* 128*  128*   K 4.3 4.1 4.0  4.0   CL 95* 94* 96*  96*   CO2 24.0 25.0 24.0  24.0     Recent Labs   Lab 03/19/21  0445 03/21/21  0524 03/22/21  0555   RBC 6.11* 5.83* 5.75   HGB 13.8 13.2 12 mg Oral BID   • Insulin Aspart Pen  1-40 Units Subcutaneous TID CC and HS   • metoprolol Tartrate  50 mg Oral 2x Daily(Beta Blocker)   • Rosuvastatin Calcium  20 mg Oral Nightly   • aspirin  81 mg Oral Daily     PEG 3350, Saline Nasal Spray, sodium chlorid

## 2021-03-22 NOTE — PROGRESS NOTES
[facility]  Progress Note    Kathryn Blake Patient Status:  Inpatient    1945 MRN [id number]   [facility] 5NW-A Attending Nathan Cevallos MD   Hosp Day # 12 PCP Ghassan Vogt MD     No acute events  Needing less O2       0 Daily          Review of Systems:  See HPI; A total of 12 systems reviewed and otherwise unremarkable. Physical Exam:  Vital signs: Blood pressure 129/63, pulse 74, temperature 97.9 °F (36.6 °C), temperature source Oral, resp.  rate 18, height 5' 5\" (1.

## 2021-03-23 NOTE — PROGRESS NOTES
[facility] Pulmonary/Critical Care Progress Note     SUBJECTIVE/Interval history:   All events, procedures, notes reviewed pt sitting in chair on 5 L and no complaints      Review of Systems:   A comprehensive 14 point re *  128* 127* 130*   K 4.0  4.0 3.9 3.9   CL 96*  96* 92* 97*   CO2 24.0  24.0 25.0 27.0     Recent Labs   Lab 03/21/21  0524 03/22/21  0555 03/23/21  0552   RBC 5.83* 5.75 5.46   HGB 13.2 12.9* 12.4*   HCT 41.2 40.9 38.2*   MCV 70.7* 71.1* 70.0* (Cholecalciferol)  2,000 Units Oral Daily   • enoxaparin  0.5 mg/kg Subcutaneous 2 times per day   • Albuterol Sulfate HFA  4 puff Inhalation Q4H   • guaiFENesin ER  600 mg Oral BID   • zinc sulfate  220 mg Oral BID   • ascorbic acid  1,000 mg Oral Daily

## 2021-03-23 NOTE — COVID NURSING ASSESSMENT
COVID-19 Daily Discharge Readiness-Nursing    O2 Sat at Rest:  94 %  5L  O2 Sat with Exertion: SPO2% Ambulation on Oxygen: 87  % on 7  liters   Temperature max from last 24 hrs: Temp (24hrs), Av °F (36.7 °C), Min:97.8 °F (36.6 °C), Max:98.3 °F (36.8 °C

## 2021-03-23 NOTE — PHYSICAL THERAPY NOTE
PHYSICAL THERAPY EVALUATION - INPATIENT     Room Number: 503/503-A  Evaluation Date: 3/23/2021  Type of Evaluation: Initial  Physician Order: PT Eval and Treat    Presenting Problem: (+) Covid  Reason for Therapy: Mobility Dysfunction and Discharge P extremity ROM and strength are within functional limits     Lower extremity ROM is within functional limits     Lower extremity strength is within functional limits     BALANCE  Static Sitting: Fair  Dynamic Sitting: Fair -  Static Standing: Poor +  Dynami place.        Exercise/Education Provided:  Bed mobility  Energy conservation  Functional activity tolerated  Gait training  Posture  Transfer training    Patient End of Session: Up in chair;Needs met;Call light within reach;RN aware of session/findings; Al eyewear, hair protection. Pt was masked during session.

## 2021-03-23 NOTE — PLAN OF CARE
Problem: Diabetes/Glucose Control  Goal: Glucose maintained within prescribed range  Description: INTERVENTIONS:  - Monitor Blood Glucose as ordered  - Assess for signs and symptoms of hyperglycemia and hypoglycemia  - Administer ordered medications to m supplementation based on oxygen saturation or ABGs  - Provide Smoking Cessation handout, if applicable  - Encourage broncho-pulmonary hygiene including cough, deep breathe, Incentive Spirometry  - Assess the need for suctioning and perform as needed  - Ass

## 2021-03-23 NOTE — PROGRESS NOTES
[facility]  Progress Note    Jacquie Valdez Patient Status:  Inpatient    1945 MRN [id number]   [facility] 5NW-A Attending Johnathan Coughlin MD   Hosp Day # 15 PCP Nathalie Munguia MD         SUBJECTIVE:  Subjective:  Jerry Cooper 42*   CREATSERUM 1.40*   < > 1.14   < > 1.23 1.48* 1.25  1.25 1.36* 1.01   CA 8.8   < > 8.1*   < > 8.2* 8.2* 8.0*  8.0* 7.9* 8.1*   MG 2.7*  --  2.4  --   --   --   --   --   --    GLU 99   < > 119*   < > 83 202* 99  99 237* 68*    < > = values in this int PORTABLE  (CPT=71045)  TECHNIQUE:  AP chest radiograph was obtained.   COMPARISON:  [facility] , XR, CHEST PA   LATERAL, 7/11/2005, 11:18 AM.  INDICATIONS:  pos covid here forl antibodies  PATIENT STATED HISTORY: (As transcribed by Technologist)  Patient is COV chronic kidney disease (HCC)    Principal Problem:    Hypoxemia  Active Problems:    COVID-19    Hyponatremia    Pneumonia due to COVID-19 virus    Stage 3 chronic kidney disease    Type 2 diabetes mellitus with stage 3 chronic kidney disease ([facility].)

## 2021-03-23 NOTE — PROGRESS NOTES
[facility]  Progress Note    Gricelda Jason Patient Status:  Inpatient    1945 MRN [id number]   [facility] 5NW-A Attending Sherryle Rhein, MD   [facility] Day # 15 PCP Cecelia Borges MD     No acute events  Sitting up   Eating wel Daily          Review of Systems:  See HPI; A total of 12 systems reviewed and otherwise unremarkable. Physical Exam:  Vital signs: Blood pressure 132/60, pulse 80, temperature 98 °F (36.7 °C), temperature source Oral, resp.  rate 18, height 5' 5\" (1.65 allowing me to participate in this patient's care. Please feel free to call me with any questions or concerns.     Del Puga MD  3/23/2021

## 2021-03-24 NOTE — PROGRESS NOTES
[facility]  Progress Note    Odalys Roger Patient Status:  Inpatient    1945 MRN [id number]   [facility] 5NW-A Attending Shailesh Gtz MD   [facility] Day # 15 PCP Haseeb Liz MD     No acute events        Potassium Chlorid Daily(Beta Blocker)  Rosuvastatin Calcium (CRESTOR) tab 20 mg, 20 mg, Oral, Nightly  aspirin EC tab 81 mg, 81 mg, Oral, Daily          Review of Systems:  See HPI; A total of 12 systems reviewed and otherwise unremarkable. Physical Exam:  Vital signs:  B patient's care. Please feel free to call me with any questions or concerns.     Del Puga MD  3/24/2021

## 2021-03-24 NOTE — DIETARY NOTE
[address] [place] [address]     Admitting diagnosis:  Hypoxemia [R09.02]  Hyponatremia [E87.1]  Pneumonia due to COVID-19 virus [U07.1, J12.82]  COVID-19 [U07.1]    Ht: 165.1 cm (5' 5\")  Wt: 66 kg (145 lb 9.6 oz).    Body mass

## 2021-03-24 NOTE — COVID NURSING ASSESSMENT
COVID-19 Daily Discharge Readiness-Nursing    O2 Sat at Rest:  SPO2% on Room Air at Rest: 87  %   O2 Sat with Exertion: 82% on 4L, requiring 15L HFNC to maintain O2 above 90%  Temperature max from last 24 hrs: Temp (24hrs), Av °F (36.7 °C), Min:97.5 °F

## 2021-03-24 NOTE — CM/SW NOTE
PT recommending HH. Referral sent via [facility]. Orders and F2F complete. Will need to follow up with family once accepting [facility] agencies have responded. 14:15  [facility]  has accepted.  Information placed on AVS.    Sylvia Chinchilla MSN RN, CTL

## 2021-03-24 NOTE — COVID NURSING ASSESSMENT
COVID-19 Daily Discharge Readiness-Nursing    O2 Sat at Rest:  SPO2% on Room Air at Rest: 87  %   O2 Sat with Exertion: SPO2% Ambulation on Oxygen: 87  % on Ambulation oxygen flow (liters per minute): 7  liters   Temperature max from last 24 hrs: Temp (24h

## 2021-03-24 NOTE — PROGRESS NOTES
[facility]                INFECTIOUS DISEASE PROGRESS NOTE    Cosme Bass Patient Status:  Inpatient    1945 MRN [id number]   [facility] 5NW-A Attending Chuck Obrien MD   Hosp Day # 15 PCP Jamie Delacruz MD     An 1. 25  1.25   < > 1.01 1.37* 0.96   GFRAA 66   < > 64  64   < > 83 58* 88   GFRNAA 57*   < > 56*  56*   < > 72 50* 76   CA 8.2*   < > 8.0*  8.0*   < > 8.1* 8.3* 8.3*   ALB 2.3*  --  2.3*  --  2.2*  --   --    *   < > 128*  128*   < > 130* 127* 132*

## 2021-03-24 NOTE — PROGRESS NOTES
[facility]  Progress Note    Lourdes Sarabia Patient Status:  Inpatient    1945 MRN [id number]   [facility] 5NW-A Attending Diomedes Richard MD   Hosp Day # 15 PCP Jcarlos Sosa MD         SUBJECTIVE:  Subjective:  Kaye Gaines unlabored       Heart:    Regular rate and rhythm, S1 and S2 normal,     Abdomen:     Soft, non-tender, bowel sounds active all four quadrants,                Extremities:    no cyanosis, icterus or edema                                  Neurologic :  Gene Date: 3/12/2021  PROCEDURE:  XR CHEST AP PORTABLE  (CPT=71045)  TECHNIQUE:  AP chest radiograph was obtained.   COMPARISON:  [facility] , XR, XR CHEST AP PORTABLE  (CPT=71045), 3/10/2021, 6:18 PM.  INDICATIONS:  hypoxia  PATIENT STATED HISTORY: (As transcribed (Cholecalciferol)  2,000 Units Oral Daily   • enoxaparin  0.5 mg/kg Subcutaneous 2 times per day   • Albuterol Sulfate HFA  4 puff Inhalation Q4H   • guaiFENesin ER  600 mg Oral BID   • zinc sulfate  220 mg Oral BID   • ascorbic acid  1,000 mg Oral Daily completed    Dyslipidemia–statin     Leukopenia due to COVID-19–monitor counts     Anemia–anemia of chronic disease     DVT prophylaxis–subcu Lovenox     See tests ordered,  Available and radiology reviewed  All consultant notes reviewed  Discussed with nu

## 2021-03-24 NOTE — OCCUPATIONAL THERAPY NOTE
OCCUPATIONAL THERAPY EVALUATION - INPATIENT     Room Number: 503/503-A  Evaluation Date: 3/24/2021  Type of Evaluation: Initial  Presenting Problem: COVID    Physician Order: IP Consult to Occupational Therapy  Reason for Therapy: ADL/IADL Dysfunction and limits    VISION  Current Vision: no visual deficits    PERCEPTION  Overall Perception Status:   WFL - within functional limits    SENSATION  Light touch:  intact    Communication: Pt is able to communicate all basic needs and wants    Behavioral/Emotional 4L as at 4L when OT entered room pt dropped to 87%, pt required 6L to maintain 90%. Patient End of Session: In bed;Needs met;Call light within reach; All patient questions and concerns addressed;RN aware of session/findings; Alarm set    ASSESSMENT     Denise Goals: 5    ADL Goals   Patient will perform upper body dressing:  with supervision  Patient will perform lower body dressing:  with supervision  Patient will perform toileting: with supervision    Functional Transfer Goals  Patient will transfer from  [facility]

## 2021-03-24 NOTE — PROGRESS NOTES
[facility] Pulmonary/Critical Care Progress Note     SUBJECTIVE/Interval history: All events, procedures, notes reviewed. pt required 15 L this am and now back to 4L.  He desaturated when going to bedside commode and took 03/24/21  0834   * 36* 151*   BUN 51* 49* 45*   CREATSERUM 1.37* 0.96 1.08   GFRAA 58* 88 77   GFRNAA 50* 76 66   CA 8.3* 8.3* 8.3*   * 132* 129*   K 4.4 3.5 3.9   CL 94* 99 97*   CO2 25.0 30.0 26.0     Recent Labs   Lab 03/22/21  0555 03/23/2 acetylcysteine  600 mg Oral BID   • Senna  8.6 mg Oral Daily   • docusate sodium  100 mg Oral BID   • dexamethasone  6 mg Oral Daily   • sodium chloride  1 g Oral Daily   • Vitamin D3 (Cholecalciferol)  2,000 Units Oral Daily   • enoxaparin  0.5 mg/kg Subc

## 2021-03-25 NOTE — PROGRESS NOTES
[facility] Pulmonary/Critical Care Progress Note     SUBJECTIVE/Interval history: All events, procedures, notes reviewed. Pt feels well today. On less fio2 (4 L). No cough or sob.        Review of Systems:   A comprehens GFRAA 77  --   --  79 96   GFRNAA 66  --   --  69 83   CA 8.3*  --   --  8.4* 8.4*   *  --   --  133* 133*   K 3.9   < > 5.1 5.3* 4.4   CL 97*  --   --  103 102   CO2 26.0  --   --  28.0 27.0    < > = values in this interval not displayed.      Rece Senna  8.6 mg Oral Daily   • docusate sodium  100 mg Oral BID   • dexamethasone  6 mg Oral Daily   • sodium chloride  1 g Oral Daily   • Vitamin D3 (Cholecalciferol)  2,000 Units Oral Daily   • enoxaparin  0.5 mg/kg Subcutaneous 2 times per day   • Albuter

## 2021-03-25 NOTE — COVID NURSING ASSESSMENT
COVID-19 Daily Discharge Readiness-Nursing    O2 Sat at Rest: 4L NC at 91%  O2 Sat with Exertion: pt requires 15L HFNC to maintain O2 sat above 90%  Temperature max from last 24 hrs: Temp (24hrs), Av.3 °F (36.8 °C), Min:97.8 °F (36.6 °C), Max:99 °F (37

## 2021-03-25 NOTE — PHYSICAL THERAPY NOTE
PHYSICAL THERAPY TREATMENT NOTE - INPATIENT    Room Number: 803/023-Z     Session: 1   Number of Visits to Meet Established Goals: 4    Presenting Problem: (+) Covid     History related to current admission: pt admitted from home, (+) Covid on 3/10/21.  PM and blankets)?: None   -   Sitting down on and standing up from a chair with arms (e.g., wheelchair, bedside commode, etc.): A Little   -   Moving from lying on back to sitting on the side of the bed?: None   How much help from another person does the antonia address the above deficits to assist patient in returning to prior to level of function. Recommend RW for ambulation. DISCHARGE RECOMMENDATIONS  PT Discharge Recommendations: Home with home health PT     PLAN  PT Treatment Plan: Bed mobility; Endurance;E

## 2021-03-25 NOTE — PROGRESS NOTES
[facility]  Progress Note    Giuseppe Hill Patient Status:  Inpatient    1945 MRN [id number]   [facility] 5NW-A Attending Bo Jensen MD   [facility] Day # 13 PCP Astrid Del Real MD     No acute events  Improving respiratory s Calcium (CRESTOR) tab 20 mg, 20 mg, Oral, Nightly  aspirin EC tab 81 mg, 81 mg, Oral, Daily          Review of Systems:  See HPI; A total of 12 systems reviewed and otherwise unremarkable.     Physical Exam:  Vital signs: Blood pressure 106/52, pulse 73, te fluids  4. DM  5. HTN- controlled        Thank you for allowing me to participate in this patient's care. Please feel free to call me with any questions or concerns.     Romayne Ponder, MD  3/25/2021

## 2021-03-25 NOTE — PROGRESS NOTES
[facility]                INFECTIOUS DISEASE PROGRESS NOTE    Gm Wang Patient Status:  Inpatient    1945 MRN [id number]   [facility] 5NW-A Attending Ky Ford MD   Hosp Day # 15 PCP Edwige Gibson MD     An 03/23/21  0552 03/23/21  1749 03/24/21  0834 03/24/21  0834 03/24/21  1607 03/24/21  1803 03/25/21  0546   GLU 83   < > 99  99   < > 68*   < > 151*  --   --  136* 124*   BUN 56*   < > 52*  52*   < > 42*   < > 45*  --   --  43* 39*   CREATSERUM 1.23   < > 1 home on 02 when stable        MD Brandi Vargas [facility]  [phone]

## 2021-03-25 NOTE — OCCUPATIONAL THERAPY NOTE
OCCUPATIONAL THERAPY TREATMENT NOTE - INPATIENT     Room Number: 860/451-N  Session: 1   Number of Visits to Meet Established Goals: 5    Presenting Problem: COVID    History related to current admission: pt admitted from home, (+) Covid on 3/10/21.  PMH in brushing teeth?: A Little  -   Eating meals?: A Little    AM-PAC Score:  Score: 18  Approx Degree of Impairment: 46.65%  Standardized Score (AM-PAC Scale): 38.66  CMS Modifier (G-Code): CK    FUNCTIONAL TRANSFER ASSESSMENT  Supine to Sit : Contact guard as supine to sit:  with supervision  Patient will transfer from sit to stand:  with supervision  Patient will transfer to toilet:  with supervision     UE Exercise Program Goal  Patient will be supervision with bilateral AROM HEP (home exercise program).

## 2021-03-25 NOTE — COVID NURSING ASSESSMENT
COVID-19 Daily Discharge Readiness-Nursing    O2 Sat at Rest:  95% on 3L  O2 Sat with Exertion:   Temperature max from last 24 hrs: Temp (24hrs), Av.9 °F (36.6 °C), Min:97.5 °F (36.4 °C), Max:98.2 °F (36.8 °C)    Inflammatory Markers:   Recent Labs   L

## 2021-03-25 NOTE — PROGRESS NOTES
[facility]  Progress Note    Janell Oleary Patient Status:  Inpatient    1945 MRN [id number]   [facility] 5NW-A Attending Cosme Mckeon MD   Hosp Day # 15 PCP Massiel Rausch MD         SUBJECTIVE:  Subjective:  Sonu Reid --  30.0 26.0  --  28.0 27.0   BUN 56*   < > 51*  --  49* 45*  --  43* 39*   CREATSERUM 1.14   < > 1.37*  --  0.96 1.08  --  1.05 0.88   CA 8.1*   < > 8.3*  --  8.3* 8.3*  --  8.4* 8.4*   MG 2.4  --   --   --   --   --   --   --  2.0   *   < > 272* CHEST PA   LATERAL, 7/11/2005, 11:18 AM.  INDICATIONS:  pos covid here forl antibodies  PATIENT STATED HISTORY: (As transcribed by Technologist)  Patient is COVID+ with worsening symptoms.     FINDINGS:  There is diffuse airspace disease, most notable withi Hypoxemia  Active Problems:    COVID-19    Hyponatremia    Pneumonia due to COVID-19 virus    Stage 3 chronic kidney disease    Type 2 diabetes mellitus with stage 3 chronic kidney disease ([facility] [address]          Plan:  Continue present management,   Hypoxemia  Due

## 2021-03-26 PROCEDURE — 99221 1ST HOSP IP/OBS SF/LOW 40: CPT | Performed by: INTERNAL MEDICINE

## 2021-03-26 NOTE — PROGRESS NOTES
[facility] Pulmonary/Critical Care Progress Note     SUBJECTIVE/Interval history: All events, procedures, notes reviewed. No acute events overnight, still not proning. Remains on 6L at rest, up to 10L on exertion.  No fe Recent Labs   Lab 03/24/21  0515 03/25/21  0545 03/26/21  0552   RBC 5.54 5.34 4.96   HGB 12.6* 12.2* 11.3*   HCT 38.9* 39.1 36.8*   MCV 70.2* 73.2* 74.2*   MCH 22.7* 22.8* 22.8*   MCHC 32.4 31.2 30.7*   RDW 14.1 14.5 14.6   NEPRELIM 11.14* 9.80* 7.83* Subcutaneous 2 times per day   • Albuterol Sulfate HFA  4 puff Inhalation Q4H   • guaiFENesin ER  600 mg Oral BID   • zinc sulfate  220 mg Oral BID   • ascorbic acid  1,000 mg Oral Daily   • Insulin Aspart Pen  1-30 Units Subcutaneous TID CC   • gabapentin

## 2021-03-26 NOTE — PROGRESS NOTES
[facility]                INFECTIOUS DISEASE PROGRESS NOTE    Ernestina Acuna Patient Status:  Inpatient    1945 MRN [id number]   [facility] 5NW-A Attending Damaris Song MD   Hosp Day # 16 PCP Mega Dominguez MD     An 152*  152*   BUN 52*  52*   < > 42*   < > 39* 45* 41*  41*   CREATSERUM 1.25  1.25   < > 1.01   < > 0.88 1.27 1.05  1.05   GFRAA 64  64   < > 83   < > 96 63 79  79   GFRNAA 56*  56*   < > 72   < > 83 55* 69  69   CA 8.0*  8.0*   < > 8.1*   < > 8.4* 8.7 8.7

## 2021-03-26 NOTE — PAYOR COMM NOTE
--------------  CONTINUED STAY REVIEW    Payor: Jayna [address] #:  [id number]  Authorization Number: [id number]    Admit date: 3/10/21  Admit time:  8:16 PM    Admitting Physician: More Flores MD  Attending Physician:   Sherryle Rhein, MD 3/25/2021 2043 Given 600 mg Oral Yusuf Bowles, RN      Insulin Aspart Pen (NOVOLOG) 100 UNIT/ML flexpen 1-40 Units     Date Action Dose Route User    3/26/2021 1334 Given 16 Units Subcutaneous (Left Upper Arm) Fiorella Lares RN    3/26/2021 7370 Given Given 220 mg Oral Manuel Mccoy RN      dexamethasone (DECADRON) tab 6 mg     Date Action Dose Route User    3/26/2021 0829 Given 6 mg Oral [place]        3/26 HOSPITALIST NOTE  SUBJECTIVE:   Subjective:   Gricelda Gomez is a(n) 67 year o --  2.0 --  1.8   * --  --  136* 124* 217* 152*  152*   < > = values in this interval not displayed.             Recent Labs   Lab 03/21/21   0524 03/22/21   0555 03/23/21   0552 03/26/21   0552   ALT 35 37 40 68*   AST 48* 49* 58* 67*   ALB 2.3* 2.3 Who   03/26/21 1211 98.3 °F (36.8 °C) 88 18 119/53 95 % — High flow nasal cannula 6 L/min MM   03/26/21 0731 98.2 °F (36.8 °C) 67 18 107/54 93 % — High flow nasal cannula 8 L/min MM   03/26/21 0700 — — — — 96 % — Nasal cannula 2 L/min AF   03/26/21 0600 —

## 2021-03-26 NOTE — PROGRESS NOTES
COVID-19 Daily Discharge Readiness-Nursing    O2 Sat at Rest:  SPO2% on Room Air at Rest: 87  % 2-6L  O2 Sat with Exertion: SPO2% Ambulation on Oxygen: 82  % on Ambulation oxygen flow (liters per minute): 15  Liters.  Ambulation at 10L to maintain above 90%

## 2021-03-26 NOTE — PROGRESS NOTES
[facility]  Progress Note    Jem Goldberg Patient Status:  Inpatient    1945 MRN [id number]   [facility] 5NW-A Attending Ryley Reed MD   [facility] Day # 12 PCP Phi Hu MD     No acute events  Chart reviewed  Continu Oral, Nightly  aspirin EC tab 81 mg, 81 mg, Oral, Daily          Review of Systems:  See HPI; A total of 12 systems reviewed and otherwise unremarkable.     Physical Exam:  Vital signs: Blood pressure 119/53, pulse 88, temperature 98.3 °F (36.8 °C), tempera 4. DM  5. HTN- controlled        Thank you for allowing me to participate in this patient's care. Please feel free to call me with any questions or concerns.     Thien Zheng MD  3/26/2021

## 2021-03-26 NOTE — COVID NURSING ASSESSMENT
COVID-19 Daily Discharge Readiness-Nursing    O2 Sat at Rest:  SPO2% on Room Air at Rest: 87 % on 5-7L HFNC  O2 Sat with Exertion: SPO2% Ambulation on Oxygen: 82  % on Ambulation oxygen flow (liters per minute): 15  liters   Temperature max from last 24 hr

## 2021-03-26 NOTE — PHYSICAL THERAPY NOTE
PHYSICAL THERAPY TREATMENT NOTE - INPATIENT    Room Number: 503/503-A     Session: 2   Number of Visits to Meet Established Goals: 4    Presenting Problem: (+) Covid     History related to current admission: pt admitted from home, (+) Covid on 3/10/21.  PM from a chair with arms (e.g., wheelchair, bedside commode, etc.): None   -   Moving from lying on back to sitting on the side of the bed?: None   How much help from another person does the patient currently need. ..   -   Moving to and from a bed to a chair patients with this level of impairment may benefit from home c HHPT  DISCHARGE RECOMMENDATIONS  PT Discharge Recommendations: Home with home health PT     PLAN  PT Treatment Plan: Bed mobility; Endurance; Energy conservation;Patient education; Family educatio

## 2021-03-26 NOTE — PROGRESS NOTES
[facility]  Progress Note    Fior Payan Patient Status:  Inpatient    1945 MRN [id number]   [facility] 5NW-A Attending Alina Contreras MD   Hosp Day # 16 PCP Lan Harding MD         SUBJECTIVE:  Subjective:  Venita Woods 1. 27 1.05  1.05   CA 8.3*  --   --  8.4* 8.4* 8.7 8.7  8.7   MG  --   --   --   --  2.0  --  1.8   *  --   --  136* 124* 217* 152*  152*    < > = values in this interval not displayed.        Recent Labs   Lab 03/21/21  0524 03/22/21  0555 03/23/21 with worsening symptoms. FINDINGS:  There is diffuse airspace disease, most notable within the mid and lower lungs, consistent with multifocal COVID-19 pneumonia. Mildly enlarged cardiac silhouette. No significant pleural fluid or pneumothorax. present management,   Hypoxemia  Due to COVID-19, oxygenation, medical management, pulmonary and ID follow-up  -CTA PE protocol negative for PE   -c/w supplemental o2  -still desaturating to 82% on ambulation requiring 15L, pulm follow up   -c/w NAC

## 2021-03-27 PROCEDURE — 99291 CRITICAL CARE FIRST HOUR: CPT | Performed by: INTERNAL MEDICINE

## 2021-03-27 NOTE — CONSULTS
[facility]     Cosme Bass Patient Status:  Inpatient    1945 MRN [id number]   [facility] 4SW-A Attending Celso Crenshaw MD   [facility] Day # 16 PCP Jamie Delacruz Intravenous, Q8H  dexamethasone Sodium Phosphate (DECADRON) 4 MG/ML injection 6 mg, 6 mg, Intravenous, Q24H  Dexmedetomidine HCl in NaCl (PRECEDEX) 400 MCG/100ML premix infusion, , ,   Dexmedetomidine HCl in NaCl (PRECEDEX) 400 MCG/100ML premix infusion, 0 PRN  [COMPLETED] sodium chloride tab 1 g, 1 g, Oral, BID with meals  Vitamin D3 cap 2,000 Units, 2,000 Units, Oral, Daily  [COMPLETED] iohexol (OMNIPAQUE) 350 MG/ML injection 100 mL, 100 mL, Intravenous, ONCE PRN  Albuterol Sulfate  (90 Base) MCG/AC aid of RN. Physical Exam  /51   Pulse 114   Temp 97.7 °F (36.5 °C)   Resp 25   Ht 5' 5\" (1.651 m)   Wt 154 lb 5.2 oz (70 kg)   SpO2 97%   BMI 25.68 kg/m²   Body mass index is 25.68 kg/m².     I did not enter patients room today due to policy to Units Subcutaneous Daily   • Senna  8.6 mg Oral Daily   • docusate sodium  100 mg Oral BID   • sodium chloride  1 g Oral Daily   • Vitamin D3 (Cholecalciferol)  2,000 Units Oral Daily   • Albuterol Sulfate HFA  4 puff Inhalation Q4H   • guaiFENesin ER  600 for allowing us to participate in patient's care. Please call us with any questions or concerns. GI will follow closely.      Jairo Acuña MD  3/27/2021  [facility]

## 2021-03-27 NOTE — PROGRESS NOTES
[facility]    In[facility] Cardiology Critical Care Progress Note    Cathlene Meals Patient Status:  Inpatient    1945 MRN [id number]   [facility] 4SW-A Attending Brandi Pat MD   [facility] Day # 16 PCP Rosio Penn smokeless tobacco.    Objective:   Temp: 97.6 °F (36.4 °C)  Pulse: 103  Resp: 36  BP: 103/49  FiO2 (%): 80 %    Intake/Output:     Intake/Output Summary (Last 24 hours) at 3/27/2021 0749  Last data filed at 3/27/2021 9801  Gross per 24 hour   Intake 480 ml

## 2021-03-27 NOTE — RESPIRATORY THERAPY NOTE
Received patient @2300 on vapotherm 40L 90%. Currently at 80% FiO2, saturations low to mid 90s. Will wean as tolerated, will continue to monitor.

## 2021-03-27 NOTE — CONSULTS
[facility]  Report of Consultation    Adaline Coma Patient Status:  Inpatient    1945 MRN [id number]   [facility] 4SW-A Attending Beckie Hyman MD   Hosp Day # 12 PCP Barrett Salinas MD insulin detemir (LEVEMIR) 100 UNIT/ML flextouch 15 Units, 15 Units, Subcutaneous, Daily  •  acetylcysteine (MUCOMYST) 20 % solution 600 mg, 600 mg, Oral, BID  •  Senna (SENOKOT) tab 8.6 mg, 8.6 mg, Oral, Daily  •  PEG 3350 (MIRALAX) powder packet 17 g, 17 (36.7 °C), temperature source Temporal, resp. rate (!) 38, height 65\", weight 154 lb 5.2 oz (70 kg), SpO2 99 %.   Temp (24hrs), Av.4 °F (36.9 °C), Min:98 °F (36.7 °C), Max:98.8 °F (37.1 °C)    Wt Readings from Last 3 Encounters:  21 : 154 lb 5.2

## 2021-03-27 NOTE — PLAN OF CARE
Assume care in am. Patient drowsy but follows commands. Heart rate remains elevated. Denies pain, temp 99.4. Slightly anxious. Kept NPO. Family updated. All consults aware. Ng tube intact to LIS. Vital signs monitored. Precedex started then dc.  Patient bec

## 2021-03-27 NOTE — PLAN OF CARE
Rapid response called on pt while on the floor. Pt transferred to ICU room 453, assumed care of pt. Pt received on non-rebreather, switched to vapotherm 40L 100% by RT. ST on monitor.  used, pt alert and oriented. Following all commands.  Dr. Meenu Bill

## 2021-03-27 NOTE — PROGRESS NOTES
[facility]  Progress Note     Odalys Roger Patient Status:  Inpatient    1945 MRN [id number]   [facility] 4SW-A Attending Shailesh Gtz MD   Hosp Day # 12 PCP Haseeb Liz MD     Chief Complaint: Rapid Response 24.0  24.0   < > 27.0   < > 27.0 25.0 26.0  26.0   ALKPHO 77  --  75  --   --   --  58   AST 49*  --  58*  --   --   --  67*   ALT 37  --  40  --   --   --  68*   BILT 0.7  --  0.6  --   --   --  0.4   TP 6.0*  --  5.5*  --   --   --  5.6*    < > = values failure in patient with known COVId -19 pneumonia   1. Pt completed remdesivir, Actemra  2. Currently on steroids, lovenox 30 / Q12  3. ABG with hypoxia, lactic acidosis likely from hypoxia  4.  No acute findings on CXR    Plan of care:   Trasnfer to ICU f

## 2021-03-27 NOTE — PROGRESS NOTES
[facility] Pulmonary/Critical Care Progress Note     SUBJECTIVE/Interval history: All events, procedures, notes reviewed. Transferred to ICU overnight, had hematemesis with blood clots and worsening hypoxia.  NGT placed 133* 135*   K 5.0 4.1  4.1 5.1    100  100 104   CO2 25.0 26.0  26.0 25.0     Recent Labs   Lab 03/25/21  0545 03/25/21  0545 03/26/21  0552 03/27/21  0005 03/27/21  0504   RBC 5.34   < > 4.96 4.26 3.38*   HGB 12.2*   < > 11.3* 9.8* 7.9*   HCT 39.1 3/26/2021  CONCLUSION:  Bilateral airspace opacities of the chest appear essentially stable from the prior exam.  Areas of pneumonia are suspected. Follow-up is recommended to ensure resolution.     Dictated by (CST): Jean-Claude Blancas MD on 3/26/2021 at 7:56 PM for possible aspiration pneumonia  Follow NGT output, serial hgb, coags and s/s bleeding; GI consulted  follow output, lytes  Continue dexamethasone day 18  Completed remdesivir 3/15, tocilizumab 3/12  He was encouraged to prone - agreeable to day  Holding

## 2021-03-27 NOTE — PROGRESS NOTES
[facility]  Nephrology Progress Note    Cathlene Meals Patient Status:  Inpatient    1945 MRN [id number]   [facility] 4SW-A Attending Brandi Pat MD   Hosp Day # 16 PCP Bryon Hollins MD       SUBJECTIVE:  Overnight even 1. 27 1.05  1.05 1.23   CA 8.4* 8.4* 8.7 8.7  8.7 8.4*   MG  --  2.0  --  1.8 2.0   * 124* 217* 152*  152* 153*       Recent Labs   Lab 03/21/21  0524 03/22/21  0555 03/23/21  0552 03/26/21  0552 03/27/21  0504   ALT 35 37 40 68* 164*   AST 48* 49* 5 Pen (NOVOLOG) 100 UNIT/ML flexpen 1-40 Units, 1-40 Units, Subcutaneous, TID CC and HS  Metoprolol Tartrate (LOPRESSOR) tab 50 mg, 50 mg, Oral, 2x Daily(Beta Blocker)  Rosuvastatin Calcium (CRESTOR) tab 20 mg, 20 mg, Oral, Nightly  aspirin EC tab 81 mg, 81

## 2021-03-27 NOTE — RESPIRATORY THERAPY NOTE
Responded to RRT. Pt SpO2 was bouncing between low 80's to 100% on 15L NRB. ABG drawn and ran. Transported pt to ICU and started on Vapotherm 40L 100%. Repeat ABG to be done in 1 hr per APN. Report given to ICU RT.

## 2021-03-27 NOTE — PROGRESS NOTES
[facility]  Progress Note    Paul Gibbons Patient Status:  Inpatient    1945 MRN [id number]   [facility] 5NW-A Attending Shabnam North MD   Hosp Day # 16 PCP Graham Zuleta MD         SUBJECTIVE:  Subjective:  Hillery Cockayne 43* 39* 45* 41*  41* 81*   CREATSERUM 1.05 0.88 1.27 1.05  1.05 1.23   CA 8.4* 8.4* 8.7 8.7  8.7 8.4*   MG  --  2.0  --  1.8 2.0   * 124* 217* 152*  152* 153*       Recent Labs   Lab 03/21/21  0524 03/22/21  0555 03/23/21  0552 03/26/21  0552 03/27/ COVID+ with worsening symptoms. FINDINGS:  There is diffuse airspace disease, most notable within the mid and lower lungs, consistent with multifocal COVID-19 pneumonia. Mildly enlarged cardiac silhouette. No significant pleural fluid or pneumothorax. 2 diabetes mellitus with stage 3 chronic kidney disease ([facility] [address]    Acute respiratory failure with hypoxia (HCC)          Plan:  Continue present management,   Hypoxemia  Due to COVID-19, oxygenation, medical management, pulmonary and ID follow-up  -CTA PE pr

## 2021-03-27 NOTE — PROGRESS NOTES
Patient HR increased to 140's/150's starting at [address]. O2 sats were dropping and rising without intervention to mid 70's and back up to 90's. MD paged.   While awaiting response from MD patient Continued to desat and new dark swelling was noted under both e

## 2021-03-28 PROCEDURE — 99291 CRITICAL CARE FIRST HOUR: CPT | Performed by: INTERNAL MEDICINE

## 2021-03-28 NOTE — OPERATIVE REPORT
EGD Operative Report  Patient Name: Tess Veliz  YOB: 1945  MRN: [id number]  Procedure: Esophagogastroduodenoscopy (EGD) with gastric biopsies  Pre-operative Diagnosis & Indication: Hematemesis; Anemia  Post-operative Diagnosis:   1.  L administered.  Once adequate sedation was achieved, a bite block was placed and a lubricated tip of an Olympus video upper endoscope was inserted through the oropharynx and gently manipulated through the esophagus into the stomach and the second portion of NSAID  - Repeat EGD in 8 weeks.   - Remove NGT if OK with primary team from nutrition, and medication delivery standpoint  - Check CBC q12 hours, transfuse if Hg < 7; IVF and hemodynamics per primary team    Post-procedure, discussed findings, recommendatio

## 2021-03-28 NOTE — ANESTHESIA POSTPROCEDURE EVALUATION
[place] Patient Status:  Inpatient   Age/Gender 67year old male MRN [id number]   Location [facility]. Attending Bo Jensen MD   [facility] Day # 25 PCP Astrid Del Real MD       Anesthesia Post-op Note    23 Mkai Jung

## 2021-03-28 NOTE — PROGRESS NOTES
[facility]  Progress Note    Janet Fails Patient Status:  Inpatient    1945 MRN [id number]   [facility] 5NW-A Attending Nikko Borja MD   Hosp Day # 25 PCP Bao Bermeo MD         SUBJECTIVE:  Subjective:  Lora Navarro INR  --   --   --   --   --   --  1.30*  --   --  1.40*  --     < > = values in this interval not displayed.        Recent Labs   Lab 03/25/21  0546 03/25/21  1812 03/26/21  0552 03/27/21  0504 03/28/21  0613   * 133* 133*  133* 135* 139   K 4.4 5. Esperanza Jack MD on 3/12/2021 at 11:10 AM     Finalized by (CST): Esperanza Jack MD on 3/12/2021 at 11:11 AM       XR CHEST AP PORTABLE  (CPT=71045)    Result Date: 3/10/2021  PROCEDURE:  XR CHEST AP PORTABLE  (CPT=71045)  TECHNIQUE:  AP chest radiograph was obta Assessment/Plan:   Patient Active Problem List:     Post laminectomy syndrome     Disc degeneration, lumbar     COVID-19     Hypoxemia     Hyponatremia     Pneumonia due to COVID-19 virus     Stage 3 chronic kidney disease     Type 2 diabetes mellitus with prophylaxis–lovenox being held  in setting of hematemesis and anemia, scds     See tests ordered,  Available and radiology reviewed  All consultant notes reviewed  Discussed with nursing on floor  dvt prophylaxis reviewed  PT and/or OT  Destin Neumann MD

## 2021-03-28 NOTE — ANESTHESIA PREPROCEDURE EVALUATION
PRE-OP EVALUATION    Patient Name: Excell Scheuermann    Admit Diagnosis: Hypoxemia [R09.02]  Hyponatremia [E87.1]  Pneumonia due to COVID-19 virus [U07.1, J12.82]  COVID-19 [U07.1]    Pre-op Diagnosis: GI bleeding [K92.2]    ESOPHAGOGASTRODUODENOSCOPY (EGD injection 20 mg, 20 mg, Intravenous, Once  [COMPLETED] tolvaptan (SAMSCA) tab 15 mg, 15 mg, Oral, Once  [COMPLETED] Potassium Chloride ER (K-DUR M20) CR tab 40 mEq, 40 mEq, Oral, Q4H  [COMPLETED] tolvaptan (SAMSCA) tab 15 mg, 15 mg, Oral, Once  insulin det chloride 0.9% 250 mL IVPB, 200 mg, Intravenous, Once  [COMPLETED] remdesivir 100 mg in sodium chloride 0.9% 270 mL IVPB, 100 mg, Intravenous, Q24H  Insulin Aspart Pen (NOVOLOG) 100 UNIT/ML flexpen 1-30 Units, 1-30 Units, Subcutaneous, TID CC  gabapentin (N reviewed.             (+) hypertension                                     Endo/Other      (+) diabetes                            Pulmonary                    (+) sleep apnea       Neuro/Psych                                    Past Surgical History:   Pro

## 2021-03-28 NOTE — PLAN OF CARE
Assumed care of pt for the night shift. Pt alert and oriented x4. Maintaining adequate O2 saturations on vapotherm 30L 50%. NG with bile appearing output. Pt resting comfortably in bed. Call light within reach. Will continue to monitor closely.      + blood Patient meeting sepsis criteria in setting of fever of 101.7 and elevated leukocytosis Patient meeting sepsis criteria in setting of fever of 101.7 and elevated leukocytosis  Patient denies history of UTIs  F/U Urine Culture and Blood cultures  S/P Zosyn in ED. Will continue for now. Deescalate once speciated  Consider ID consult in AM

## 2021-03-28 NOTE — PROGRESS NOTES
[facility]                INFECTIOUS DISEASE PROGRESS NOTE    Cosme Bass Patient Status:  Inpatient    1945 MRN [id number]   [facility] 5NW-A Attending Chuck Obrien MD   Hosp Day # 25 PCP Jamie Delacruz MD     An MON 3  --   --   --   --    EOS 1  --   --   --   --     < > = values in this interval not displayed. Recent Labs   Lab 03/26/21  0552 03/27/21  0504 03/28/21  0613   *  152* 153* 155*   BUN 41*  41* 81* 48*   CREATSERUM 1.05  1.05 1.23 1. Chronic, stable, not on outpatient meds  - Monitor routine hemodynamics Chronic, stable  - Continue Entecavir 0.5mg qd   - ID- Dr. Webber

## 2021-03-28 NOTE — PROGRESS NOTES
[address] [facility]     Gm Wang Patient Status:  Inpatient    1945 MRN [id number]   [facility] 4SW-A Attending Carlos Mendiola MD   [facility] Day # 25 PCP Eileen Brito 74.2*  --  76.5*  --  73.7* 72.5*  --  75.8*  --    .0   < > 154.0  --  196.0  --  113.0* 173.0  --  104.0*  --    BAND 1  --   --   --   --   --   --   --   --   --   --    INR  --   --   --   --   --   --  1.30*  --   --  1.40*  --     < > = value hematemesis. #Hematemesis  #Anemia  Hg 5.3. received 1uPRB. Starting 2nd unit transfusion. No overt GI bleeding overnight. Looking at wbc/hg/plts, all cell downs ~50% over past several days. Dilutional + overt GI bleeding on transfer to ICU. HR 90s.  O2

## 2021-03-28 NOTE — PROGRESS NOTES
[facility]iology Critical Care Progress Note    Janet Fails Patient Status:  Inpatient    1945 MRN [id number]   [facility] 4SW-A Attending Celine Galloway MD   [facility] Day # 25 PCP Kelton Morgan surgery 11/2010     No family history on file. reports that he has never smoked.  He has never used smokeless tobacco.    Objective:   Temp: 99.3 °F (37.4 °C)  Pulse: 89  Resp: 29  BP: 104/48  FiO2 (%): 40 %    Intake/Output:     Intake/Output Summary ([place]

## 2021-03-28 NOTE — PROGRESS NOTES
[facility] Pulmonary/Critical Care Progress Note     SUBJECTIVE/Interval history: All events, procedures, notes reviewed. No acute events overnight, improved hypoxia today, weaned to 3L from vapotherm.  He denies acute c 26.0  26.0 25.0 28.0     Recent Labs   Lab 03/26/21  0552 03/27/21  0005 03/27/21  0504 03/27/21  0504 03/27/21  0954 03/27/21  1805 03/28/21  0613   RBC 4.96   < > 3.38*  --  3.75*  --  2.19*   HGB 11.3*   < > 7.9*   < > 8.6* 7.0* 5.0*   HCT 36.8*   < > 2 Finalized by (CST): Ross Francisco MD on 3/27/2021 at 9:11 PM       XR CHEST AP PORTABLE  (CPT=71045)    Result Date: 3/28/2021  CONCLUSION:  No significant change in the appearance of the portable chest radiograph.     Dictated by (CST): Cesia Khanna MD on 3 Oral BID   • ascorbic acid  1,000 mg Oral Daily   • Insulin Aspart Pen  1-30 Units Subcutaneous TID CC   • gabapentin  100 mg Oral BID   • metoprolol Tartrate  50 mg Oral 2x Daily(Beta Blocker)   • aspirin  81 mg Oral Daily     PEG 3350, Saline Nasal Spray

## 2021-03-28 NOTE — PLAN OF CARE
Assumed pt care this morning. Alert, awake. Oxygen weaning as tolerated, down to 2L now. Denies pain. Completed endoscopy earlier today. Cleared to eat. Daughter Juani Méndez updated multiple times throughout the day.

## 2021-03-29 NOTE — PROGRESS NOTES
[facility]  Progress Note    Jem Goldberg Patient Status:  Inpatient    1945 MRN [id number]   [facility] 5NW-A Attending Kay Klein MD   [facility] Day # 23 PCP Phi Hu MD         SUBJECTIVE:  Subjective:  Aniyah Romano displayed.        Recent Labs   Lab 03/25/21  0546 03/25/21  0546 03/25/21  1812 03/26/21  0552 03/27/21  0504 03/28/21  0613 03/29/21  0445   *   < > 133* 133*  133* 135* 139 136   K 4.4   < > 5.0 4.1  4.1 5.1 3.9 4.0      < > 102 100  100 104 change from when compared to 3/10/2021 chest radiograph.     Dictated by (CST): Jin Venegas MD on 3/12/2021 at 11:10 AM     Finalized by (CST): Jin Venegas MD on 3/12/2021 at 11:11 AM       XR CHEST AP PORTABLE  (CPT=71045)    Result Date: 3/10/2021  [facility] acetaminophen, ondansetron HCl, metoprolol Tartrate       Assessment/Plan:   Patient Active Problem List:     Post laminectomy syndrome     Disc degeneration, lumbar     COVID-19     Hypoxemia     Hyponatremia     Pneumonia due to COVID-19 virus     Stage COVID-19–monitor counts     DVT prophylaxis–lovenox being held  in setting of hematemesis and anemia, scds     See tests ordered,  Available and radiology reviewed  All consultant notes reviewed  Discussed with nursing on floor  dvt prophylaxis reviewed  P

## 2021-03-29 NOTE — PLAN OF CARE
Received patient on 4 L NC. Patient A/O x4. Interpretor I-Pad in room. Lung sounds diminished/clear. Patient desaturates with positional changes but recovers quickly. VSS. Report given to [facility] RN.  Spoke with patient's daughter, Dennis Arce, updated on plan of ca

## 2021-03-29 NOTE — PHYSICAL THERAPY NOTE
PHYSICAL THERAPY EVALUATION - INPATIENT     Room Number: 502/502-A  Evaluation Date: 3/29/2021  Type of Evaluation: Re-evaluation  Physician Order: PT Eval and Treat    Presenting Problem: COVID  Reason for Therapy: Mobility Dysfunction and Discharge RESTRICTION  Weight Bearing Restriction: None                PAIN ASSESSMENT  Ratin  Location: denies       COGNITION  · Overall Cognitive Status:  WFL - within functional limits  · Orientation Level:  oriented x4  · Following Commands:  follows multis tested  Comment : standing only this date    Skilled Therapy Provided: Patient received in supine with daughter present to assist with translation. Educated in role of PT need to pace self with activities and pursed lip breathing technique.   Transfers to to assist patient in returning to prior to level of function. DISCHARGE RECOMMENDATIONS  PT Discharge Recommendations: Home with home health PT    PLAN  PT Treatment Plan: Bed mobility; Endurance; Energy conservation;Patient education; Family education;Gait

## 2021-03-29 NOTE — PROGRESS NOTES
[facility] Pulmonary/Critical Care Progress Note     SUBJECTIVE/Interval history: All events, procedures, notes reviewed. Pt is onb 4 L maintaining O2 sa 90-92%. Denies cp or sob.      Review of Systems:   A comprehensive 8. 3* 8.5   * 139 136   K 5.1 3.9 4.0    106 106   CO2 25.0 28.0 27.0     Recent Labs   Lab 03/26/21  0552 03/27/21  0005 03/27/21  0504 03/27/21  0954 03/28/21  0613 03/28/21  0613 03/28/21  0832 03/28/21  1641 03/29/21  0445   RBC 4.96   < > 3 3/27/2021  CONCLUSION:  Bowel gas obscuration of the pancreas. Otherwise no diagnostic abnormality demonstrated. Continued clinical correlation recommended.     Dictated by (CST): Maxx Guevara MD on 3/27/2021 at 9:09 PM     Finalized by (CST): Maxx Guevara, Blocker)   • aspirin  81 mg Oral Daily     PEG 3350, Saline Nasal Spray, sodium chloride 0.9%, Albuterol Sulfate HFA, acetaminophen, ondansetron HCl, metoprolol Tartrate    ASSESSMENT    · Acute hypoxic respiratory failure due to COVID pneumonia  · COVID 1

## 2021-03-29 NOTE — PLAN OF CARE
Received last night at 1930H, speaks Claudia, used  service, patient is alert and oriented x 4, denies pain, no nausea nor vomiting noted, informed him of POC and plan to transfer him to the floor once bed is available, patient verbalized Eli

## 2021-03-29 NOTE — PROGRESS NOTES
Pt is a 68 y/o Claudia speaking male admitted with acute resp distress due to covid 19 infection. alerrt oriented. up in the chair. transfered from ICU this morning. completed remdesivir. Steroids? .s/p actemra. pt is on 4L02 via NC,02 sats>90%. inflamatory markers

## 2021-03-29 NOTE — PROGRESS NOTES
[facility]                INFECTIOUS DISEASE PROGRESS NOTE    Melanie Masroger Patient Status:  Inpatient    1945 MRN [id number]   [facility] 5NW-A Attending aMry Arteaga MD   Hosp Day # 23 PCP Rony Guillen MD     An 8. 5   ALB 2.3* 3.4 4.0   * 139 136   K 5.1 3.9 4.0    106 106   CO2 25.0 28.0 27.0   ALKPHO 46 34* 34*   * 116* 82*   * 139* 117*   BILT 0.3 0.6 1.0   TP 5.0* 5.3* 6.1*       No results found for: [address]

## 2021-03-29 NOTE — PROGRESS NOTES
NURSING ADMISSION NOTE      Patient admitted via Cart  Oriented to room. Safety precautions initiated. Bed in low position. Call light in reach. Pt is transferred from ICU. Pt is AOx4, VSS, afebrile and denies any pain. Jamie Paniaguai speaking.  Limited

## 2021-03-30 NOTE — DIETARY NOTE
[address] [place] [address]     Admitting diagnosis:  Hypoxemia [R09.02]  Hyponatremia [E87.1]  Pneumonia due to COVID-19 virus [U07.1, J12.82]  COVID-19 [U07.1]    Ht: 165.1 cm (5' 5\")  Wt: 70 kg (154 lb 5.2 oz).    Body mass

## 2021-03-30 NOTE — PROGRESS NOTES
[facility] Pulmonary/Critical Care Progress Note     SUBJECTIVE/Interval history: All events, procedures, notes reviewed. No acute events overnight, worked with PT earlier, up to [address]. Still not proning.  He reports feeli 03/28/21  0832 03/29/21  0445 03/29/21  1809 03/30/21  0426   RBC 3.38*   < > 2.19*   < > 3.07* 3.61* 3.69*  3.69*   HGB 7.9*   < > 5.0*   < > 7.8* 9.2* 9.2*  9.2*   HCT 24.9*   < > 16.6*   < > 23.9* 28.3* 28.8*  28.8*   MCV 73.7*   < > 75.8*   < > 77.9* 7 by (CST): Estefany Woods MD on 3/27/2021 at 9:11 PM       XR CHEST AP PORTABLE  (CPT=71045)    Result Date: 3/28/2021  CONCLUSION:  No significant change in the appearance of the portable chest radiograph.     Dictated by (CST): Melissa Villatoro MD on 3/28/2021 a encouraged to prone - agreeable today  Holding LMWH given bleeding  abd US with no acute findings; LFTs improved  Continue PPI BID  PPX: no anticoag given bleeding; PPI BID  · Dispo: floor    Makenna Curtis MD  SLA

## 2021-03-30 NOTE — PAYOR COMM NOTE
--------------  CONTINUED STAY REVIEW    Payor: Andress[employer]agros [address] Espino [address] #:  [id number]  Authorization Number: [id number]    Admit date: 3/10/21  Admit time:  8:16 PM    Admitting Physician: John Meeks MD  Attending Physician:   Liz Garza MD Adrian Akins, RN    3/29/2021 1700 Given 5 Units Subcutaneous (Left Lower Abdomen) Erin Donato, RN      Insulin Aspart Pen (NOVOLOG) 100 UNIT/ML flexpen 1-40 Units     Date Action Dose Route User    3/30/2021 1309 Given 15 Units Subcutaneous (Left Uppe feeling better.  No pain   OBJECTIVE:   VitalsExpand by Default    03/30/21   0449 03/30/21   0813 03/30/21   0933 03/30/21   1207   BP: 124/56 128/56  117/55   BP Location: Right arm Right arm     Pulse: 89 78 96 93   Resp: 26 22     Temp: 97.9 °F (36.6 °C 28.8*   MCV 73.7* < > 75.8* < > 77.9* 78.4* 78.0*  78.0*   MCH 23.4* < > 22.8* < > 25.4* 25.5* 24.9*  24.9*   MCHC 31.7 < > 30.1* < > 32.6 32.5 31.9  31.9   RDW 14.6 < > 14.8 < > 16.3* 16.8* 17.4*  17.4*   NEPRELIM 11.20* --  7.64 --  --  --  6.63   WBC 12 appears stated age   Head:    Normocephalic,  atraumatic   Neck:   Supple, symmetrical, trachea midline,        thyroid:      no JVD   Lungs:     Clear to auscultation bilaterally, respirations unlabored         Heart:    Regular rate and rhythm, S1 and S2 loss   -trend LFTs  -US abdomen shows no gallstones           COVID-19  Remains on oxygen, pulm follow up, continue to trend inflammatory markers  Completed dexamethasone, completed remdesivir   s/p toci x 1          Hyponatremia/PIETRO/Azotemia  Gentle hydra WBC 9.2   .0*                  Recent Labs   Lab 03/29/21  0445 03/30/21  0426 03/31/21  0555   * 107* 114*   BUN 35* 38* 37*   CREATSERUM 0.99 0.92 1.18   GFRAA 85 93 69   GFRNAA 74 81 60   CA 8.5 8.9 9.1   ALB 4.0 3.6 3.7    135* 13 stage 3 chronic kidney disease (HCC)    Acute respiratory failure with hypoxia (HCC)              Plan:  Continue present management,   Hypoxemia  Due to COVID-19, oxygenation, medical management, pulmonary and ID follow-up  -CTA PE protocol negative for P

## 2021-03-30 NOTE — COVID NURSING ASSESSMENT
COVID-19 Daily Discharge Readiness-Nursing    O2 Sat at Rest:  94%   O2 Sat with Exertion: SPO2% Ambulation on Oxygen: 76  % on Ambulation oxygen flow (liters per minute): 15  liters   Temperature max from last 24 hrs: Temp (24hrs), Av.9 °F (36.6 °C),

## 2021-03-30 NOTE — COVID NURSING ASSESSMENT
COVID-19 Daily Discharge Readiness-Nursing    O2 Sat at Rest:  SPO2% on Room Air at Rest: 87  %   O2 Sat with Exertion: SPO2% Ambulation on Oxygen: 83  % on Ambulation oxygen flow (liters per minute): 5  liters   Temperature max from last 24 hrs: Temp (24h

## 2021-03-30 NOTE — PROGRESS NOTES
[facility]  Progress Note    Jacquie Valdez Patient Status:  Inpatient    1945 MRN [id number]   [facility] 5NW-A Attending Johnathan Coughlin MD   Hosp Day # 21 PCP Nathalie Munguia MD         SUBJECTIVE:  Subjective:  Jerry Cooper 03/25/21  0546 03/25/21  1812 03/26/21  0552 03/27/21  0504 03/28/21  0613 03/29/21  0445 03/30/21  0426   *   < > 133*  133* 135* 139 136 135*   K 4.4   < > 4.1  4.1 5.1 3.9 4.0 4.0      < > 100  100 104 106 106 104   CO2 27.0   < > 26.0  26. 0 chest radiograph.     Dictated by (CST): Isaiah Fierro MD on 3/12/2021 at 11:10 AM     Finalized by (CST): Isaiah Fierro MD on 3/12/2021 at 11:11 AM       XR CHEST AP PORTABLE  (CPT=71045)    Result Date: 3/10/2021  PROCEDURE:  XR CHEST AP PORTABLE  (CPT=71045) Disc degeneration, lumbar     COVID-19     Hypoxemia     Hyponatremia     Pneumonia due to COVID-19 virus     Stage 3 chronic kidney disease     Type 2 diabetes mellitus with stage 3 chronic kidney disease (HCC)     Acute respiratory failure with hypoxia ( counts     DVT prophylaxis–lovenox being held  in setting of hematemesis and anemia, scds     See tests ordered,  Available and radiology reviewed  All consultant notes reviewed  Discussed with nursing on floor  dvt prophylaxis reviewed  PT and/or OT  Damaris

## 2021-03-30 NOTE — PLAN OF CARE
Problem: Diabetes/Glucose Control  Goal: Glucose maintained within prescribed range  Description: INTERVENTIONS:  - Monitor Blood Glucose as ordered  - Assess for signs and symptoms of hyperglycemia and hypoglycemia  - Administer ordered medications to m oxygenation  Description: INTERVENTIONS:  - Assess for changes in respiratory status  - Assess for changes in mentation and behavior  - Position to facilitate oxygenation and minimize respiratory effort  - Oxygen supplementation based on oxygen saturation products/factors as ordered and appropriate  Outcome: Progressing  Goal: Free from bleeding injury  Description: (Example usage: patient with low platelets)  INTERVENTIONS:  - Avoid intramuscular injections, enemas and rectal medication administration  - E

## 2021-03-30 NOTE — OCCUPATIONAL THERAPY NOTE
Attempted to see pt this PM, pt with drops in O2 and per RN not medically approp at this time, OT will follow up at later date.

## 2021-03-30 NOTE — PHYSICAL THERAPY NOTE
PHYSICAL THERAPY TREATMENT NOTE - INPATIENT    Room Number: 136/803-R     Session: 1   Number of Visits to Meet Established Goals: 5    Presenting Problem: COVID    History related to current admission: pt admitted from home, (+) Covid on 3/10/21.  RR 3/26 O2 WALK                    AM-PAC '6-Clicks' INPATIENT SHORT FORM - BASIC MOBILITY  How much difficulty does the patient currently have. ..  -   Turning over in bed (including adjusting bedclothes, sheets and blankets)?: None   -   Sitting down on and s minimal activity. Pt requires ~10min to recover on 15L O2 HFNC . Will continue to follow while in house to progress as able and as appropriate.    The AM-PAC '6-Clicks' Inpatient Basic Mobility Short Form was completed and this patient is demonstrating

## 2021-03-30 NOTE — PROGRESS NOTES
[facility]                INFECTIOUS DISEASE PROGRESS NOTE    Mickie Led Patient Status:  Inpatient    1945 MRN [id number]   [facility] 5NW-A Attending Maura Joseph MD   Hosp Day # 21 PCP Jael Garza MD     An 116* 82* 74*   * 117* 107*   BILT 0.6 1.0 0.9   TP 5.3* 6.1* 6.0*       No results found for: [address] Encounter on 03/10/21   1.  BLOOD CULTURE     Status: None (Preliminary result)    Collection Time: 03/28/21 12:24 PM    Spe

## 2021-03-31 NOTE — PLAN OF CARE
COVID-19 Daily Discharge Readiness-Nursing    O2 Sat at Rest:  SPO2% on Room Air at Rest: 87  %   O2 Sat with Exertion: SPO2% Ambulation on Oxygen: 76  % on Ambulation oxygen flow (liters per minute): 15  liters   Temperature max from last 24 hrs: Temp (24 Progressing  Goal: Patient/Family Short Term Goal  Description: Patient's Short Term Goal:   3/10 NOC: control fevers and monitor SpO2   3/11 AM: wean O2  3/11 NOC: wean O2 and sleep well   3/12 AM: wean down O2, prone as much as possible   3/12NOC: prone, GASTROINTESTINAL - ADULT  Goal: Minimal or absence of nausea and vomiting  Description: INTERVENTIONS:  - Maintain adequate hydration with IV or PO as ordered and tolerated  - Nasogastric tube to low intermittent suction as ordered  - Evaluate effectivenes

## 2021-03-31 NOTE — PROGRESS NOTES
[facility]  Progress Note    Dimas Jimenez Patient Status:  Inpatient    1945 MRN [id number]   [facility] 5NW-A Attending Shonda Lugo MD   Hosp Day # 21 PCP Aleks Rizvi MD     Subjective:  Dimas Jimenez is a(n) 7 ABGPHT 7.40   XQDIVA2T 38   ZMNGQ7A 273*   ABGHCO3 22.9   ABGBE -1.7   TEMP 98.6   TRACIE Positive   SITE Right Radial   DEV High Humid Nasal Cannula   THGB 9.7*       Invalid input(s): Sunni Wall, TROPONINT, CKMBINDEX    COVID-19 Lab Results    C 1 spray, Each Nare, Q3H PRN  Vitamin D3 cap 2,000 Units, 2,000 Units, Oral, Daily  sodium chloride 0.9% IV bolus 500 mL, 500 mL, Intravenous, PRN  guaiFENesin ER (MUCINEX) 12 hr tab 600 mg, 600 mg, Oral, BID  Albuterol Sulfate  (90 Base) MCG/ACT inh bleeding  · abd US with no acute findings; LFTs improved  · Continue PPI BID  · PPX: no anticoag given bleeding; PPI BID    Discussed with RN    Yuri Phillips MD, [facility]

## 2021-03-31 NOTE — CM/SW NOTE
Care Progression Note:  Active Acute Medical Issue:   68 y/o male with acute hypoxemic respiratory failure due to COVID-19 pneumonia, remains on high oxygen support,  currently on 8LHFNC, requires up to 15LHFNC, with ambulation, Vss     Other Contributing

## 2021-03-31 NOTE — PROGRESS NOTES
[facility]  Progress Note    Fior Payan Patient Status:  Inpatient    1945 MRN [id number]   [facility] 5NW-A Attending Alina Contreras MD   Hosp Day # 21 PCP Lan Harding MD         SUBJECTIVE:  Subjective:  Venita Woods 03/28/21  0436 03/29/21  0445 03/30/21  0426 03/31/21  0555   *   < > 133*  133*   < > 135* 139 136 135* 135*   K 4.4   < > 4.1  4.1   < > 5.1 3.9 4.0 4.0 3.5      < > 100  100   < > 104 106 106 104 101   CO2 27.0   < > 26.0  26.0   < > 25.0 28 1. No significant change from when compared to 3/10/2021 chest radiograph.     Dictated by (CST): Rabia Kwong MD on 3/12/2021 at 11:10 AM     Finalized by (CST): Rabia Kwong MD on 3/12/2021 at 11:11 AM       XR CHEST AP PORTABLE  (CPT=71045)    Result Date: acetaminophen, ondansetron HCl, metoprolol Tartrate       Assessment/Plan:   Patient Active Problem List:     Post laminectomy syndrome     Disc degeneration, lumbar     COVID-19     Hypoxemia     Hyponatremia     Pneumonia due to COVID-19 virus     Stage    IDDM with complications–Levemir, NovoLog sliding scale   He may be transferred back to his oral medications upon discharge once steroids completed    Dyslipidemia–statin     Leukopenia due to COVID-19–monitor counts     DVT prophylaxis–lovenox being h

## 2021-03-31 NOTE — PLAN OF CARE
Pt A&Ox4, Claudia speaking,  used.  on 10-15L HFNC at rest, desats down to the 80's when moving/eating. Pulm made aware of increasing O2 needs, CT ordered, nebs ordered. Pt encouraged to prone, will only side lie. VSS. Afebrile. NSR-ST on tele. wean O2  3/25 AM: wean O2  3/25 NOC: wean o2, prone overnight  3/30 am: work with PT  3/[address] [place]: wean O2 and sleep well   3/31 AM: wean O2     Interventions:   - O2, tylenol PRN, prone, IS use.   -continuous o2 monitoring, IS, proning  - quiet environment routine/schedule  - Consider collaborating with pharmacy to review patient's medication profile  Outcome: Progressing     Problem: HEMATOLOGIC - ADULT  Goal: Maintains hematologic stability  Description: INTERVENTIONS  - Assess for signs and symptoms of bl

## 2021-03-31 NOTE — PROGRESS NOTES
[facility]                INFECTIOUS DISEASE PROGRESS NOTE    Ernestina Acuna Patient Status:  Inpatient    1945 MRN [id number]   [facility] 5NW-A Attending Damaris Song MD   Hosp Day # 21 PCP Mega Dominguez MD     An 117* 107* 100*   BILT 1.0 0.9 1.0   TP 6.1* 6.0* 6.7       No results found for: [address] Encounter on 03/10/21   1.  BLOOD CULTURE     Status: None (Preliminary result)    Collection Time: 03/28/21 12:24 PM    Specimen: Blood,periphe

## 2021-04-01 PROBLEM — Z51.5 PALLIATIVE CARE BY SPECIALIST: Status: ACTIVE | Noted: 2021-01-01

## 2021-04-01 PROBLEM — Z71.89 GOALS OF CARE, COUNSELING/DISCUSSION: Status: ACTIVE | Noted: 2021-01-01

## 2021-04-01 NOTE — PROGRESS NOTES
03/31/21 2207   Provider Notification   Reason for Communication Change in status   Test/Procedure Name increase O2 need    Provider Name Other (comment)  Hillary Coffman )   Method of Communication Page   Response Waiting for response   Notification Citlalli Harding

## 2021-04-01 NOTE — RESPIRATORY THERAPY NOTE
Pt having increased work of breathing. Plant to place pt on BiPAP. Start Flolan if ECHO is not stable.

## 2021-04-01 NOTE — RESPIRATORY THERAPY NOTE
Arterial line inserted:    Using 20G arrow. Threaded without resistance. Good blood return noted. Sterile technique used.

## 2021-04-01 NOTE — PROGRESS NOTES
Received pt on 30L 70%, O2 saturations stable at 91% while lying on side. Pt sat up in an attempt to eat breakfast, O2 saturations dropped to 82% while on vapotherm. Respiratory called, turned vapo up 30L 80%.  Pt was told to prone and he was unable to tole

## 2021-04-01 NOTE — PROGRESS NOTES
[facility]  Progress Note    Dimas Jimenez Patient Status:  Inpatient    1945 MRN [id number]   [facility] 5NW-A Attending Leonardo Kaufman MD   Hosp Day # 25 PCP Aleks Rizvi MD         SUBJECTIVE:  Subjective:  Rosaura Bermudez 03/26/21  0522 03/26/21  2734 03/27/21  0504 03/27/21  0504 03/28/21  9158 03/28/21  9821 03/29/21  0445 03/30/21  0426 03/31/21  0555 03/31/21  1530 04/01/21  0528   *  133*   < > 135*   < > 139  --  136 135* 135*  --  134*   K 4.1  4.1   < > 5.1 Cardiomediastinal silhouette stable. Persistent diffuse increased interstitial and small patches of airspace opacities without new focal consolidation, pleural effusion, or pneumothorax. CONCLUSION:  1.  No significant change from when compared CC   • gabapentin  100 mg Oral BID   • metoprolol Tartrate  50 mg Oral 2x Daily(Beta Blocker)   • aspirin  81 mg Oral Daily       Albuterol Sulfate HFA, PEG 3350, Saline Nasal Spray, sodium chloride 0.9%, acetaminophen, ondansetron HCl, metoprolol Tartrate remdesivir   s/p toci x 1        Hyponatremia/PIETRO/Azotemia  Gentle hydration and monitor  Stable  S/p tolvaptan, off IVF         Neuropathy–gabapentin     Hypertension–holding all bp agents      IDDM with complications–Levemir, NovoLog sliding scale   He m

## 2021-04-01 NOTE — PHYSICAL THERAPY NOTE
IP PT on hold, pt with increased O2 requirement, and subsequent t/f to ICU. Will continue to follow and resume skilled PT as appropriate.

## 2021-04-01 NOTE — CONSULTS
[facility] Patient Status:  Inpatient    1945 MRN [id number]   [facility] 4SW-A Attending Jane Redmond MD   [facility] Day # 25 PCP Isabelle Dominguez MD     Date of Consult:  weaned off the vent. --Pt asked if ventilator will help him get better. Explained that vent will support his lungs while his body heals. He may or may not get better, its hard to say at this time. --also talked about pt's wishes regarding CPR.  Patient surgery 11/30/2004   • OTHER SURGICAL HISTORY      Right Shoulder surgery/ rotator cuff 03/28/1996   • OTHER SURGICAL HISTORY      Left Ankle surgery 11/2010          Substance History:  Smoking Status: never   Hx of Substance Use/Abuse: none     Holiness 2,000 Units, Oral, Daily  •  sodium chloride 0.9% IV bolus 500 mL, 500 mL, Intravenous, PRN  •  guaiFENesin ER (MUCINEX) 12 hr tab 600 mg, 600 mg, Oral, BID  •  zinc sulfate (ZINCATE) cap 220 mg, 220 mg, Oral, BID  •  ascorbic acid (VITAMIN C) tab 1,000 mg tissue disease can cause a similar imaging pattern. This category includes multiple peripheral, rounded bilateral ground glass infiltrates with or without consolidation and with or without visible intralobular lines (crazy paving).  Other findings in this including intubation and what follows intubation. Talked about code status. -- Pt and family wish to continue current level of care with Full code status.         Palliative Care Follow Up:    A total of   70 minutes were spent on this consult, which incl

## 2021-04-01 NOTE — PLAN OF CARE
COVID-19 Daily Discharge Readiness-Nursing    O2 Sat at Rest:  SPO2% on Room Air at Rest: 87  %   O2 Sat with Exertion: SPO2% Ambulation on Oxygen: 76  % on Ambulation oxygen flow (liters per minute): 15  liters   Temperature max from last 24 hrs: Temp (24 Long Term Goal:   Discharge to home     Interventions:  - Follow plan of care   - See additional Care Plan goals for specific interventions  Outcome: Progressing  Goal: Patient/Family Short Term Goal  Description: Patient's Short Term Goal:   3/10 NOC: con Assess and instruct to report SOB or any respiratory difficulty  - Respiratory Therapy support as indicated  - Manage/alleviate anxiety  - Monitor for signs/symptoms of CO2 retention  Outcome: Progressing     Problem: GASTROINTESTINAL - ADULT  Goal: [employer] signs of bleeding to staff  - Avoid use of toothpicks and dental floss  - Use electric shaver for shaving  - Use soft bristle tooth brush  - Limit straining and forceful nose blowing  Outcome: Progressing

## 2021-04-01 NOTE — PROGRESS NOTES
[facility] Pulmonary/Critical Care Progress Note     SUBJECTIVE/Interval history: All events, procedures, notes reviewed. Pt developed further desaturation while on vapotherm overnight and brought to icu.  He denies any --  37*  --  45*   CREATSERUM 0.92  --  1.18  --  1.17   GFRAA 93  --  69  --  70   GFRNAA 81  --  60  --  60   CA 8.9  --  9.1  --  8.8   *  --  135*  --  134*   K 4.0   < > 3.5 4.1 3.6     --  101  --  102   CO2 25.0  --  26.0  --  24.0    < (BUD=72148)    Result Date: 3/31/2021  CONCLUSION:  1. Bilateral pneumonia is unchanged in distribution and is more confluent. Typical pattern of Covid-19. Commonly reported imaging features of Covid-19 are present.   Please note that other processes such failure due to COVID pneumonia  · COVID 19 pneumonia  · Acute GI bleeding, ?gastritis  · Elevated LFTs  · Elevated inflammatory markers  · CKD  · HTN  · Hx RACHELLE  · Hyponatremia  · Microcytic anemia  · PIETRO  · Azotemia    PLAN    · Inflammatory markers and d

## 2021-04-02 NOTE — PLAN OF CARE
Received pt sedated on Propofol, Precedex and Fentanyl. Sedation increased per vent compliance and ABG results. Pt proned at 2030. Airway pressures remain upper 30's lower 40's Plateau pressure 33.  Lungs diminished, Pt was febrile, placed ice packs, temper

## 2021-04-02 NOTE — DIETARY NOTE
[facility]  NUTRITION ASSESSMENT    Pt does not meet malnutrition criteria. NUTRITION INTERVENTION:  1.  Enteral Nutrition - Recommend starting Vital HP 1.0 at 25 ml/hour advancing 10 ml/hour q 4 hours to goal rate of 45 ml/hour.   o This will provi Meals Eaten (last 3 days)     Date/Time Percent Meals Eaten (%)    03/30/21 0933  60 %    03/31/21 1331  80 %    03/31/21 1607  10 %          FOOD/NUTRITION RELATED HISTORY:   Appetite: NPO  Intake: % since admission; NPO  Intake Meeting Needs: TF to

## 2021-04-02 NOTE — PROGRESS NOTES
[facility] Pulmonary/Critical Care Progress Note     SUBJECTIVE/Interval history: All events, procedures, notes reviewed.  Pt intubated and sedated on vent      Review of Systems:   A comprehensive 14 point review of sys 1.40*   GFRAA 70 49* 56*   GFRNAA 60 42* 48*   CA 8.8 8.5 8.4*   * 134* 138   K 3.6 4.5  4.5 4.0    98 103   CO2 24.0 22.0 25.0     Recent Labs   Lab 03/31/21  0554 03/31/21  1825 04/01/21  0528 04/01/21  1744 04/02/21  0430   RBC 4.25   < > 4. 3/31/2021  CONCLUSION:  1. Bilateral pneumonia is unchanged in distribution and is more confluent. Typical pattern of Covid-19. Commonly reported imaging features of Covid-19 are present.   Please note that other processes such as influenza pneumonia and o Sodium Phosphate  6 mg Intravenous Q12H   • acetylcysteine (ACETADOTE) infusion *second dose*  50 mg/kg Intravenous Q24H   • docusate sodium  100 mg Oral BID   • Chlorhexidine Gluconate  15 mL Mouth/Throat [email or web address]   • Insulin Aspart Pen  1-40 Units S Vanco/meropenem started. ID following. · Cont lasix fgiven elevated probnp  · Echo reviewed and hyperdynamic LV. There is pulmonary hypertension now (could not be assessed before). No tapse reported.    · hgb remains stable  · Proph: scd/ppi  · D dimer is

## 2021-04-02 NOTE — PROGRESS NOTES
[facility] Note:  Renal Adjustment for meropenem (MERREM)    Paul Gibbons is a 67year old patient who has been prescribed meropenem (MERREM) 500 mg every 8 hrs. The estimated creatinine clearance is 39 mL/min (A) (based on SCr of 1.4 mg/dL (H)).  tEta Andre

## 2021-04-02 NOTE — PROGRESS NOTES
[facility]  Progress Note    Shen Mcarthur Patient Status:  Inpatient    1945 MRN [id number]   [facility] 5NW-A Attending Kevin Angel MD   Hosp Day # 23 PCP Remy Moya MD         SUBJECTIVE:  Subjective:  Overnight 03/29/21  0445 03/30/21  0426 03/30/21  0426 03/31/21  0555 03/31/21  1530 04/01/21  0528 04/01/21  1744 04/02/21  0430   *   < > 139   < > 136   < > 135*  --  135*  --  134* 134* 138   K 5.1   < > 3.9   < > 4.0   < > 4.0   < > 3.5 4.1 3.6 4.5  4.5 4 left.  Cardiomediastinal silhouette stable. Persistent diffuse increased interstitial and small patches of airspace opacities without new focal consolidation, pleural effusion, or pneumothorax. CONCLUSION:  1.  No significant change from when co Senna  8.6 mg Oral Daily   • sodium chloride  1 g Oral Daily   • Vitamin D3 (Cholecalciferol)  2,000 Units Oral Daily   • ascorbic acid  1,000 mg Oral Daily   • metoprolol Tartrate  50 mg Oral 2x Daily(Beta Blocker)   • aspirin  81 mg Oral Daily     • prop likely      Hematemesis/Acute anemia  -Hb dropped 4 grams, overnight with hematemesis, could be gastritis related from decadron, stress ulcer   -pending GI evaluation    -daily cbc   -PPI IV  -holding lovenox  -Hb dropped to 5.3 on 03/28   -s/p egd with ga

## 2021-04-02 NOTE — PROGRESS NOTES
[address] [facility] 149 Follow Up    Cosme Bass Patient Status:  Inpatient    1945 MRN [id number]   [facility] 4SW-A Attending Celso Crenshaw MD   Hosp Day # 23 PCP Jamie Delacruz MD     Summary: Erica Jimenes was intuba IVPB, 50 mg/kg, Intravenous, Q24H  •  morphINE sulfate (PF) 2 MG/ML injection 1 mg, 1 mg, Intravenous, Q2H PRN  •  fentaNYL citrate (SUBLIMAZE) 0.05 MG/ML injection 25 mcg, 25 mcg, Intravenous, Q30 Min PRN **OR** fentaNYL citrate (SUBLIMAZE) 0.05 MG/ML inj ipratropium-albuterol (DUONEB) nebulizer solution 3 mL, 3 mL, Nebulization, 6 times per day  •  budesonide (PULMICORT) 0.5 MG/2ML nebulizer solution 0.5 mg, 0.5 mg, Nebulization, 2 times daily  •  Albuterol Sulfate  (90 Base) MCG/ACT inhaler 4 puff, Imaging:  [unfilled]    Objective:  Vital Signs:  Blood pressure 109/60, pulse 98, temperature 98.2 °F (36.8 °C), resp. rate 25, height 5' 5\" (1.651 m), weight 141 lb 5 oz (64.1 kg), SpO2 93 %. Body mass index is 23.52 kg/m².   Non-verbal si

## 2021-04-02 NOTE — OCCUPATIONAL THERAPY NOTE
Pt being followed by OT; chart reviewed. Pt transferred to ICU 4/1/21 due to decline in respiratory status. Pt currently intubated and not medically appropriate to participate in therapy. Will place patient ON HOLD. Please re-order therapy, as appropriate.

## 2021-04-02 NOTE — PLAN OF CARE
Pt received from 5th floor for increased WOB, tachypnea with RR 40-50s. Initially pt denied HERSON but as RR increased, pt began top state his breathing was getting tired.    Catalina PEACOCK notified, ABG obtained, precedex initiated, and Bipap placed for i

## 2021-04-02 NOTE — PROCEDURES
Sunil Patient Status:  Hospital Outpatient Surgery    1936 MRN [id number]   [facility] ENDOSCOPY Attending Annia Kemp MD   Hosp Day # 21 PCP Cecelia Borges MD     OPERATIVE REPORT:     DATE OF OP in the IVAN anterior lobe segment with the instillation of 60 mL sterile saline and return of 40mL which was sent for cmv, pjp, gal;acetomanen, general, afb, funal microbiological studies including cell count/diffferential,cytology. complications.      EBL

## 2021-04-02 NOTE — PHYSICAL THERAPY NOTE
Pt being followed by physical therapy and chart reviewed. Pt transferred to ICU 4/1/21 due to decline in respiratory status. Pt currently intubated and not medically appropriate to participate in therapy. Will place patient ON HOLD.  Please re-order therapy

## 2021-04-02 NOTE — PROGRESS NOTES
04/02/21 0331   Vent Information   $ RT Standby Charge (per 15 min) 1   Ventilator Initiation 04/01/21   Ventilation Day(s) 2   Interface Invasive   Vent Type    Vent ID rental   Vent Mode VC+   Settings   FiO2 (%) 100 %   Resp Rate (Set) 22   Vt (

## 2021-04-02 NOTE — PROGRESS NOTES
[facility]                INFECTIOUS DISEASE PROGRESS NOTE    Paul Gibbons Patient Status:  Inpatient    1945 MRN [id number]   [facility] 5NW-A Attending Shabnam North MD   Hosp Day # 23 PCP Graham Zuleta MD     An > 114*  --  84 378* 244*   BUN 38*   < > 37*  --  45* 47* 42*   CREATSERUM 0.92   < > 1.18  --  1.17 1.57* 1.40*   GFRAA 93   < > 69  --  70 49* 56*   GFRNAA 81   < > 60  --  60 42* 48*   CA 8.9   < > 9.1  --  8.8 8.5 8.4*   ALB 3.6  --  3.7  --   --   --

## 2021-04-02 NOTE — RESPIRATORY THERAPY NOTE
Pt remain stable on ventilator setting of RR 22  FIO2 60% Peep 5. Breath sounds are clear diminish with minimal secretions.

## 2021-04-03 NOTE — PROGRESS NOTES
04/03/21 1315   Vent Information   $ RT Standby Charge (per 15 min) 1   Ventilator Initiation 04/01/21   Ventilation Day(s) 3   Interface Invasive   Vent Type    Vent ID 10   Vent Mode VC+   Settings   FiO2 (%) 50 %   Resp Rate (Set) 22   Vt (Set,

## 2021-04-03 NOTE — PLAN OF CARE
Received sedated on ventilator. Maintained on precedex, propofol and fentanyl drips for ventilator compliance. SR-ST on monitor. Afebrile. SBP had been >90 off of precedex.    Placed in prone position at 2045.   @ 2000 500cc  residual from tube feeding note

## 2021-04-03 NOTE — PROGRESS NOTES
INFECTIOUS DISEASE PROGRESS NOTE Charles Webervee Paytonadria Patient Status:  Inpatient    1945 MRN [id number]   [facility] 4SW-A Attending Isaiah Gomez MD   Hosp Day # 25 PCP Graham Zuleta MD     Subjective: no acute events Remdesivir x 5 days, received TOCI, and on decadron  Progressive respiratory failure, intubated, fever  -was on zosyn through 3/31  Repeat blood cultures, sputum cx, bronch per pulm. Thusfar NGTD  Meropenem, vancomycin pending above.   Narrow when able

## 2021-04-03 NOTE — PROGRESS NOTES
Pharmacy Note: Renal dose adjustment   Melanie Carter was originally prescribed Meropenem 500 mg every 8 hours which was renally dose adjusted at the time of the original order per P&T approved renal dosing protocol. Renal function has now improved.

## 2021-04-03 NOTE — PROGRESS NOTES
[facility]  Progress Note    Ernestina Acuna Patient Status:  Inpatient    1945 MRN [id number]   [facility] 5NW-A Attending Damaris Song MD   Hosp Day # 25 PCP Mega Dominguez MD         SUBJECTIVE:  Subjective:  Abdi Doan CA 8.3*   < > 8.5   < > 9.1  --   --  8.8 8.5 8.4* 8.1*   MG 2.2  --  2.2  --   --   --   --   --   --  2.3 2.2   PHOS 3.1  --  2.6  --   --   --   --   --   --  5.2* 2.8   *   < > 130*   < > 114*  --   --  84 378* 244* 221*    < > = values in thi 3/10/2021  PROCEDURE:  XR CHEST AP PORTABLE  (CPT=71045)  TECHNIQUE:  AP chest radiograph was obtained.   COMPARISON:  [facility] , XR, CHEST PA   LATERAL, 7/11/2005, 11:18 AM.  INDICATIONS:  pos covid here forl antibodies  PATIENT STATED HISTORY: (As transcrib Oral Daily     • propofol 60 mcg/kg/min (04/03/21 0603)   • norepinephrine Stopped (04/02/21 1230)   • vasopressin (PITRESSIN) infusion for shock Stopped (04/01/21 1630)   • dexmedetomidine 1.5 mcg/kg/hr (04/03/21 8926)   • fentanyl 175 mcg/hr (04/03/21 06 etiology, may be ischemic from hypotension/blood loss   -trend LFTs  -US abdomen shows no gallstones         COVID-19  Remains on oxygen, pulm follow up, continue to trend inflammatory markers  Completed dexamethasone, completed remdesivir   s/p toci x 1

## 2021-04-03 NOTE — PROGRESS NOTES
[facility] Pulmonary/Critical Care Progress Note     SUBJECTIVE/Interval history: All events, procedures, notes reviewed. Pt is intubated and sedated on vent. Now fio2 40%.        Review of Systems:   A comprehensive 14 0. 92   GFRAA 49* 56* 93   GFRNAA 42* 48* 81   CA 8.5 8.4* 8.1*   * 138 138   K 4.5  4.5 4.0 3.5   CL 98 103 105   CO2 22.0 25.0 27.0     Recent Labs   Lab 03/31/21  0554 03/31/21  1825 04/01/21  0528 04/01/21  1744 04/02/21  0430 04/02/21  1540 04/03 3/31/2021  CONCLUSION:  1. Bilateral pneumonia is unchanged in distribution and is more confluent. Typical pattern of Covid-19. Commonly reported imaging features of Covid-19 are present.   Please note that other processes such as influenza pneumonia and o meropenem  500 mg Intravenous Q12H   • vancomycin  15 mg/kg Intravenous Q12H   • Heparin Sodium (Porcine)  5,000 Units Subcutaneous Q8H [facility]   • Metoclopramide HCl  10 mg Intravenous Q8H   • furosemide  20 mg Intravenous TID   • dexamethasone Sodium Phospha Need to aim or drive pressure <58. RR increased overnight due to respiratory acidosis. May decrease rate and TV to decrease drive and [place]ueau pressure  · BC ngtd day 1. Vanco/meropenem started. ID following. · Cont lasix fgiven elevated probnp.  Cr stabl

## 2021-04-03 NOTE — PLAN OF CARE
Received pt on vent at FiO2 40%/+5. Tolerated settings well throughout shift. RASS -5, titrating down fentanyl gtt. Pt received sedated on propofol, fentanyl, and precedex.  Temp decreased throughout shift, arsen hugger applied throughout afternoon, resolved

## 2021-04-03 NOTE — PROGRESS NOTES
Pt remains on VC+ 22/400/40%/+5. [email or web address]. Nebs given Q4. Suctioning a small amount of thick white secretions. Will continue to monitor.       04/03/21 0316   Vent Information   $ RT Standby Charge (per 15 min) 1  (vent check, neb tx)   Ventilator Initiat

## 2021-04-04 NOTE — PROGRESS NOTES
[facility] Pulmonary/Critical Care Progress Note     SUBJECTIVE/Interval history: All events, procedures, notes reviewed. Intubated and sedated on vent      Review of Systems:   A comprehensive 14 point review of systems 81 82   CA 8.4* 8.1* 8.2*    138 140   K 4.0 3.5 4.1    105 107   CO2 25.0 27.0 28.0     Recent Labs   Lab 03/31/21  0554 03/31/21  1825 04/01/21  0528 04/01/21  1744 04/02/21  0430 04/02/21  1540 04/03/21  0439 04/03/21  1553 04/04/21  0423 EPIUR Many*       Interval Radiology:   XR ABDOMEN (1 VIEW) (CPT=74018)    Result Date: 4/4/2021  CONCLUSION:  NG tube, tip distal stomach/proximal duodenum.      Dictated by (CST): Lynette Griffin MD on 4/04/2021 at 6:57 AM     Finalized by (CST): Lynette Griffin 300 mg Per NG Tube Q6H   • zinc sulfate  220 mg Per NG Tube BID   • budesonide  0.5 mg Nebulization 2 times daily   • pantoprazole (PROTONIX) IV push  40 mg Intravenous Q12H   • Senna  8.6 mg Oral Daily   • sodium chloride  1 g Oral Daily   • Vitamin D3 (C

## 2021-04-04 NOTE — PLAN OF CARE
Received pt on 40% FiO2/+5 sedated on fentanyl, propofol and precedex. Attempted to wean sedated throughout shift, tachypnea and desaturations noted. Pt opening eyes and coughing, non-purposeful movement, not following commands. Versed given.  Thick, blood

## 2021-04-04 NOTE — PROGRESS NOTES
Pt remains on VC+ 22/400/40%/+5. [email or web address]. Nebs given Q4. Breath sounds diminished/clear. ABG ordered for the AM. Will continue to monitor.       04/04/21 0301   Vent Information   $ RT Standby Charge (per 15 min) 1  (vent check, neb tx)   Ventilator Init

## 2021-04-04 NOTE — PROGRESS NOTES
04/04/21 1316   Vent Information   $ RT Standby Charge (per 15 min) 1   Ventilator Initiation 04/01/21   Ventilation Day(s) 4   Interface Invasive   Vent Type    Vent ID 10   Vent Mode VC+   Settings   FiO2 (%) 45 %   Resp Rate (Set) 22   Vt (Set,

## 2021-04-04 NOTE — PROGRESS NOTES
[facility]  Progress Note    Cosme Bass Patient Status:  Inpatient    1945 MRN [id number]   [facility] 5NW-A Attending Chuck Obrien MD   Hosp Day # 25 PCP Jamie Delacruz MD         SUBJECTIVE:  Subjective:  Cruz Ards 04/03/21  0439 04/04/21  0423      < > 134* 134* 138 138 140   K 4.0   < > 3.6 4.5  4.5 4.0 3.5 4.1      < > 102 98 103 105 107   CO2 27.0   < > 24.0 22.0 25.0 27.0 28.0   BUN 35*   < > 45* 47* 42* 33* 38*   CREATSERUM 0.99   < > 1.17 1.57* 1.4 radiograph.     Dictated by (CST): Aleah Rock MD on 3/12/2021 at 11:10 AM     Finalized by (CST): Aleah Rock MD on 3/12/2021 at 11:11 AM       XR CHEST AP PORTABLE  (CPT=71045)    Result Date: 3/10/2021  PROCEDURE:  XR CHEST AP PORTABLE  (CPT=71045)  TECHNI 2,000 Units Oral Daily   • ascorbic acid  1,000 mg Oral Daily   • metoprolol Tartrate  50 mg Oral 2x Daily(Beta Blocker)   • aspirin  81 mg Oral Daily     • propofol 40 mcg/kg/min (04/04/21 3413)   • norepinephrine Stopped (04/03/21 2704)   • vasopressin lovenox  -Hb dropped to 5.3 on 03/28   -s/p egd with gastric ulcers, c/w ppi bid   -hb remains stable since egd     Transaminitis   -unclear etiology, may be ischemic from hypotension/blood loss   -trend LFTs  -US abdomen shows no gallstones         COVID-

## 2021-04-04 NOTE — PLAN OF CARE
Received sedated on ventilator. Weaning down sedation as tolerated. With decreased sedation, noted to have have periods of ventilator dyssynchrony with drop in O2 sats to 87-89%, FiO2 increased to 50%. Sr-St on monitor. Sbp borderline at start of shift.  H

## 2021-04-05 NOTE — PROGRESS NOTES
[facility]                INFECTIOUS DISEASE PROGRESS NOTE    Shen Mcarthur Patient Status:  Inpatient    1945 MRN [id number]   [facility] 5NW-A Attending Kevin Angel MD   Hosp Day # 32 PCP Remy Moya MD     An 04/05/21  0413   * 106* 226*   BUN 33* 38* 43*   CREATSERUM 0.92 0.91 0.80   GFRAA 93 94 100   GFRNAA 81 82 87   CA 8.1* 8.2* 8.0*   ALB 3.0* 2.7* 2.5*    140 138   K 3.5 4.1 4.0    107 106   CO2 27.0 28.0 28.0   ALKPHO 61 58 63   AST 50

## 2021-04-05 NOTE — PROGRESS NOTES
[address] [facility] 149 Follow Up    Narendra Reyes Patient Status:  Inpatient    1945 MRN [id number]   [facility] 4SW-A Attending Caity Thompson MD   [facility] Day # 32 PCP Taco Miguel MD       Subjective:  Kunal Martinez 650 mg, 650 mg, Oral, Q6H PRN **OR** acetaminophen (TYLENOL) 160 MG/5ML oral liquid 650 mg, 650 mg, Oral, Q6H PRN **OR** acetaminophen (TYLENOL) 650 MG rectal suppository 650 mg, 650 mg, Rectal, Q6H PRN  •  docusate sodium (COLACE) liquid 100 mg, 100 mg, O Daily  •  PEG 3350 (MIRALAX) powder packet 17 g, 17 g, Oral, Daily PRN  •  sodium chloride tab 1 g, 1 g, Oral, Daily  •  Saline Nasal Spray (SALINE MIST) 1 spray, 1 spray, Each Nare, Q3H PRN  •  Vitamin D3 cap 2,000 Units, 2,000 Units, Oral, Daily  •  sodi level of care. --Full code.          Emotion support provided to patient/family today: Yes      A total of 15 minutes  were spent on this consult, which included all of the following:direct face to face contact, history taking, physical examination, and >

## 2021-04-05 NOTE — DIETARY NOTE
[facility]  NUTRITION ASSESSMENT    Pt does not meet malnutrition criteria at this time. NUTRITION INTERVENTION:  1. Enteral Nutrition - Trickle Feeds: Vital High Protein at 10 ml/hr x 24 hrs.   · When okay to advance TF's per MD, recommend increasi changes noted. Continue to encourage PO intake as able.      ANTHROPOMETRICS:  Ht: 165.1 cm (5' 5\")  Wt: 68 kg (149 lb 14.6 oz). BMI: Body mass index is 24.95 kg/m².   IBW: 56.8 kg    WEIGHT HISTORY:   Wt Readings from Last 10 Encounters:  04/05/21 : 68 k

## 2021-04-05 NOTE — PROGRESS NOTES
[facility]  Progress Note    Mickie Riddle Patient Status:  Inpatient    1945 MRN [id number]   [facility] 4SW-A Attending Darlene Marcos MD   Hosp Day # 32 PCP Jael Garza MD     Subjective:  Mickie Riddle is a(n) 7 226*   BUN 33* 38* 43*   CREATSERUM 0.92 0.91 0.80   GFRAA 93 94 100   GFRNAA 81 82 87   CA 8.1* 8.2* 8.0*   ALB 3.0* 2.7* 2.5*    140 138   K 3.5 4.1 4.0    107 106   CO2 27.0 28.0 28.0   ALKPHO 61 58 63   AST 50* 45* 55*   ALT 61 51 43   BILT >89%  · Continue to advance tube feeds as tolerated to goal  · Advance regimen for constipation  · Continue dexamethasone- currently 6mg daily  · Followup blood cultures, remain negative as well as bronch cultures. Consider stopping vanco- defer to ID.  On

## 2021-04-05 NOTE — PLAN OF CARE
Received pt sedated on Propofol, Precedex, and fentanyl. Remains on vent, had a coughing episode continues with bloody secretions, FiO2 increased to 100% sedation increased and PRN Fentanyl given. SR, on/off levophed.  OGT with trickle feed, tolerating, abd

## 2021-04-05 NOTE — PLAN OF CARE
Pt RR 30s, stacking breaths, asynchronous with ventilator. Fentanyl drip increased, fentanyl boluses given to aid in ventilator compliance. Daughter, Reginaldo Vargas, updated on plan of care. Pt's wife at bedside this evening.

## 2021-04-05 NOTE — PROGRESS NOTES
Received pt on VC+ 22/400/60%/+5. ETT [email or web address]. Nebs given Q6 with BID pulmicort. Suctioning a moderate amount of blood from ETT. Pt desaturated after suctioning/coughing and FiO2 increased to 100% but is slowly being weaned back down.  Will continue to monit

## 2021-04-05 NOTE — PROGRESS NOTES
[facility]  Progress Note    Norm Hollis Patient Status:  Inpatient    1945 MRN [id number]   [facility] 5NW-A Attending Cuca Suarez MD   Hosp Day # 32 PCP Rachelle Kee MD         SUBJECTIVE:  Subjective:  Jamel Aaron 140 138   K 4.5  4.5 4.0 3.5 4.1 4.0   CL 98 103 105 107 106   CO2 22.0 25.0 27.0 28.0 28.0   BUN 47* 42* 33* 38* 43*   CREATSERUM 1.57* 1.40* 0.92 0.91 0.80   CA 8.5 8.4* 8.1* 8.2* 8.0*   MG  --  2.3 2.2 2.3 2.2   PHOS  --  5.2* 2.8 2.4* 3.1   * 24 3/10/2021  PROCEDURE:  XR CHEST AP PORTABLE  (CPT=71045)  TECHNIQUE:  AP chest radiograph was obtained.   COMPARISON:  [facility] , XR, CHEST PA   LATERAL, 7/11/2005, 11:18 AM.  INDICATIONS:  pos covid here forl antibodies  PATIENT STATED HISTORY: (As transcrib norepinephrine Stopped (04/05/21 0231)   • vasopressin (PITRESSIN) infusion for shock Stopped (04/01/21 1630)   • dexmedetomidine 1.5 mcg/kg/hr (04/05/21 3153)   • fentanyl 100 mcg/hr (04/05/21 1211)     morphINE sulfate, fentaNYL citrate **OR** fentaNYL c LFTs  -US abdomen shows no gallstones         COVID-19  Remains on oxygen, pulm follow up, continue to trend inflammatory markers  Completed dexamethasone, completed remdesivir   s/p toci x 1        Hyponatremia/PIETRO/Azotemia  Gentle hydration and monitor

## 2021-04-05 NOTE — RESPIRATORY THERAPY NOTE
Pt remain stable on ventilator setting of RR 22  FiO2 100 % (increased due to desaturation) Peep 5. Breath sounds are clear diminish with minimal hemoptysis secretions.

## 2021-04-06 NOTE — PROGRESS NOTES
[facility]                INFECTIOUS DISEASE PROGRESS NOTE    Shen Mcarthur Patient Status:  Inpatient    1945 MRN [id number]   [facility] 5NW-A Attending Kevin Angel MD   Hosp Day # 32 PCP Remy Moya MD     An 4.0    106 106   CO2 28.0 28.0 31.0   ALKPHO 58 63 70   AST 45* 55* 50*   ALT 51 43 43   BILT 0.5 0.7 0.4   TP 6.5 6.0* 6.4       Vancomycin Trough (ug/mL)   Date Value   04/04/2021 12.9     Microbiology    Hospital Encounter on 03/10/21   1.  BLOOD C

## 2021-04-06 NOTE — PLAN OF CARE
Received patient following night RN. Patient is sedated on Propofol, precedex and fentanyl. Unresponsive to painful stimuli. Sedation titrated for ventilator synchrony. Patient on 80% FIO2- weaned to 50%.  Stacking breaths with respiratory rate in the 30s a

## 2021-04-06 NOTE — PROGRESS NOTES
[facility]  Progress Note    Mickie Riddle Patient Status:  Inpatient    1945 MRN [id number]   [facility] 4SW-A Attending Darlene Marcos MD   Hosp Day # 32 PCP Jael Garza MD     Subjective:  Mickie Riddle is a(n) 7 .0*  --   --  169.0 211.0    < > = values in this interval not displayed.        Recent Labs   Lab 04/04/21  0423 04/05/21  0413 04/06/21  0426   * 226* 206*   BUN 38* 43* 42*   CREATSERUM 0.91 0.80 0.84   GFRAA 94 100 98   GFRNAA 82 87 85 monitor for any further bleeding, suspct earlier related to suction trauma   · Continue sedation, goal RASS 0 to -2; however given respiratory failure with ARDS, will likely need escalated sedation to maintain respiratory/ventilator goals; if dyssynchrony

## 2021-04-06 NOTE — PROGRESS NOTES
[id number] [facility] 149 Follow Up    Roddie Balloon Patient Status:  Inpatient    1945 MRN [id number]   [facility] 4SW-A Attending Liz Garza MD   Hosp Day # 32 PCP Bryson Gosselin, MD     Subjective:  [address]  Oral, Q6H PRN **OR** acetaminophen (TYLENOL) 160 MG/5ML oral liquid 650 mg, 650 mg, Oral, Q6H PRN **OR** acetaminophen (TYLENOL) 650 MG rectal suppository 650 mg, 650 mg, Rectal, Q6H PRN  •  docusate sodium (COLACE) liquid 100 mg, 100 mg, Oral, BID  •  mag (MIRALAX) powder packet 17 g, 17 g, Oral, Daily PRN  •  sodium chloride tab 1 g, 1 g, Oral, Daily  •  Saline Nasal Spray (SALINE MIST) 1 spray, 1 spray, Each Nare, Q3H PRN  •  Vitamin D3 cap 2,000 Units, 2,000 Units, Oral, Daily  •  sodium chloride 0.9% IV Peripheral pulses are 2+ Edema not present  Skin: Warm and dry. Healthcare Agent Appointed: No            Assessment/Recommendations:     Encounter for Palliative care/goals of care:   --spoke with pt's daughter Austin Linares on phone.   She mentioned that fa

## 2021-04-06 NOTE — PROGRESS NOTES
[facility]  Progress Note    Janet Fails Patient Status:  Inpatient    1945 MRN [id number]   [facility] 5NW-A Attending Nikko Borja MD   Hosp Day # 32 PCP Bao Bermeo MD         SUBJECTIVE:  Subjective:  Lora Navarro 04/02/21  0430 04/03/21  0439 04/04/21  0423 04/05/21  0413 04/06/21  0426    138 140 138 140   K 4.0 3.5 4.1 4.0 4.0    105 107 106 106   CO2 25.0 27.0 28.0 28.0 31.0   BUN 42* 33* 38* 43* 42*   CREATSERUM 1.40* 0.92 0.91 0.80 0.84   CA 8.4* 8 radiograph was obtained. COMPARISON:  [facility] , XR, CHEST PA   LATERAL, 7/11/2005, 11:18 AM.  INDICATIONS:  pos covid here forl antibodies  PATIENT STATED HISTORY: (As transcribed by Technologist)  Patient is COVID+ with worsening symptoms.     FINDINGS:  T 1056)   • fentanyl 150 mcg/hr (04/06/21 1043)     morphINE sulfate, fentaNYL citrate **OR** fentaNYL citrate, acetaminophen **OR** acetaminophen **OR** acetaminophen, magnesium hydroxide, bisacodyl, Fleet Enema, Midazolam HCl, fentanyl, Albuterol Sulfate H markers  Completed dexamethasone, completed remdesivir   s/p toci x 1          Neuropathy–gabapentin, currently off oral medications     Hypertension–holding all bp agents, currently hypotensive needing pressors     IDDM with complications–Levemir, NovoLog

## 2021-04-06 NOTE — PLAN OF CARE
Received pt sedated, unable to follow commands, withdraws from painful stimuli. Pupils equal and reactive. Fentanyl, precedex, and propofol gtt per [facility] for sedation. #7.5 ETT in place with FiO2 titrated per RT. Diminished breath sounds.  Inline bloody secre

## 2021-04-07 NOTE — RESPIRATORY THERAPY NOTE
Pt remain stable on ventilator setting of RR 28  FiO2 40% Peep 5. Breath sounds are clear diminish with minimal secretions.

## 2021-04-07 NOTE — PROGRESS NOTES
[facility]  Progress Note    Adaline Coma Patient Status:  Inpatient    1945 MRN [id number]   [facility] 5NW-A Attending Curt Moe MD   Hosp Day # 28 PCP Barrett Salinas MD         SUBJECTIVE:  Subjective:  Rey Montiel 106 106 102   CO2 25.0   < > 27.0 28.0 28.0 31.0 33.0*   BUN 42*   < > 33* 38* 43* 42* 37*   CREATSERUM 1.40*   < > 0.92 0.91 0.80 0.84 0.77   CA 8.4*   < > 8.1* 8.2* 8.0* 8.5 8.5   MG 2.3  --  2.2 2.3 2.2  --   --    PHOS 5.2*  --  2.8 2.4* 3.1  --   -- COMPARISON:  [facility] , XR, CHEST PA   LATERAL, 7/11/2005, 11:18 AM.  INDICATIONS:  pos covid here forl antibodies  PATIENT STATED HISTORY: (As transcribed by Technologist)  Patient is COVID+ with worsening symptoms.     FINDINGS:  There is diffuse airspace d (04/01/21 0680)   • dexmedetomidine 1.5 mcg/kg/hr (04/07/21 0952)   • fentanyl 150 mcg/hr (04/07/21 7524)     morphINE sulfate, fentaNYL citrate **OR** fentaNYL citrate, acetaminophen **OR** acetaminophen **OR** acetaminophen, magnesium hydroxide, bisacody pulm follow up, continue to trend inflammatory markers  Completed dexamethasone, completed remdesivir   s/p toci x 1          Neuropathy–gabapentin, currently off oral medications     Hypertension–holding all bp agents, currently hypotensive needing presso

## 2021-04-07 NOTE — CM/SW NOTE
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

## 2021-04-07 NOTE — PLAN OF CARE
Assumed pt care at 0730. Pt in bed resting quietly. on ventilator. No s/s of acute distress noted at this time. VSS. Pt does not appear to be in any pain at this time, as he is on fentanyl gtt, will monitor.  Pt is sedated with RASS of -5, see M

## 2021-04-07 NOTE — PLAN OF CARE
Received pt sedated, unable to follow commands, does not withdraw from painful stimuli. Pupils equal and reactive. Fentanyl, precedex, and propofol gtt per [facility] for sedation. #7.5 ETT in place with FiO2 titrated per RT. Diminished breath sounds.  Minimal inl

## 2021-04-07 NOTE — PROGRESS NOTES
[facility]                INFECTIOUS DISEASE PROGRESS NOTE    Blank Sibley Patient Status:  Inpatient    1945 MRN [id number]   [facility] 5NW-A Attending Gloria Dangelo MD   Hosp Day # 28 PCP Esvin Stewart MD     An 106 102   CO2 28.0 31.0 33.0*   ALKPHO 63 70 66   AST 55* 50* 48*   ALT 43 43 36   BILT 0.7 0.4 0.4   TP 6.0* 6.4 6.2*       Vancomycin Trough (ug/mL)   Date Value   04/04/2021 12.9     Microbiology    Hospital Encounter on 03/10/21   1.  BLOOD CULTURE

## 2021-04-07 NOTE — PROGRESS NOTES
04/07/21 0519   Vent Information   $ RT Standby Charge (per 15 min) 1   Ventilator Initiation 04/01/21   Ventilation Day(s) 7   Interface Invasive   Vent Type    Vent ID [id number]   Vent Mode VC+   Settings   FiO2 (%) 50 %   Resp Rate (Set) 28   Vt (Set

## 2021-04-07 NOTE — PROGRESS NOTES
[facility]  Progress Note    Marilu Ennis Patient Status:  Inpatient    1945 MRN [id number]   [facility] 4SW-A Attending Chito Tran MD   Hosp Day # 28 PCP Angela Riojas MD     Subjective:  Marilu Ennis is a(n) 7 28.3* 27.7*   .0 211.0 233.0       Recent Labs   Lab 04/05/21  0413 04/06/21  0426 04/07/21  0421   * 206* 199*   BUN 43* 42* 37*   CREATSERUM 0.80 0.84 0.77   GFRAA 100 98 102   GFRNAA 87 85 88   CA 8.0* 8.5 8.5   ALB 2.5* 2.4* 2.2*    monitor for any further bleeding, suspect earlier related to suction trauma   · Continue sedation, goal RASS 0 to -2; however given respiratory failure with ARDS, will likely need escalated sedation to maintain respiratory/ventilator goals; if dyssynchrony

## 2021-04-08 NOTE — PROGRESS NOTES
[facility] Pulmonary/Critical Care Progress Note     SUBJECTIVE/Interval history: All events, procedures, notes reviewed.  Intubated and sedated on vent    Review of Systems:   A comprehensive 14 point review of systems 85 88 86   CA 8.5 8.5 8.4*    138 136   K 4.0 4.0 4.4    102 101   CO2 31.0 33.0* 31.0     Recent Labs   Lab 04/02/21  0430 04/02/21  1540 04/06/21  0426 04/07/21  0421 04/08/21  0426   RBC 4.00   < > 3.60* 3.50* 3.49*   HGB 10.1*   < > 9.0* 8. Finalized by (CST): Susie Mariscal MD on 4/08/2021 at 11:15 AM       XR CHEST AP PORTABLE  (CPT=71045)    Result Date: 4/8/2021  CONCLUSION:  1. No significant change, allowing for differences in technique when compared to 4/7/2021 chest radiograph.   Please se hydroxide, bisacodyl, Fleet Enema, Midazolam HCl, fentanyl, Albuterol Sulfate HFA, PEG 3350, Saline Nasal Spray, sodium chloride 0.9%, ondansetron HCl, metoprolol Tartrate    ASSESSMENT    · Acute hypoxic respiratory failure due to COVID pneumonia, intubat

## 2021-04-08 NOTE — PROGRESS NOTES
[facility]  Progress Note    Gricelda Gomez Patient Status:  Inpatient    1945 MRN [id number]   [facility] 5NW-A Attending More Flores MD   Hosp Day # 34 PCP Cecelia Borges MD         SUBJECTIVE:  Subjective:  Blanca Winn < > 140 138 140 138 136   K 4.0   < > 3.5   < > 4.1 4.0 4.0 4.0 4.4      < > 105   < > 107 106 106 102 101   CO2 25.0   < > 27.0   < > 28.0 28.0 31.0 33.0* 31.0   BUN 42*   < > 33*   < > 38* 43* 42* 37* 39*   CREATSERUM 1.40*   < > 0.92   < > 0.91 0. 11:11 AM       XR CHEST AP PORTABLE  (CPT=71045)    Result Date: 3/10/2021  PROCEDURE:  XR CHEST AP PORTABLE  (CPT=71045)  TECHNIQUE:  AP chest radiograph was obtained.   COMPARISON:  [facility] , XR, CHEST PA   LATERAL, 7/11/2005, 11:18 AM.  INDICATIONS:  pos Daily(Beta Blocker)     • propofol 70 mcg/kg/min (04/08/21 0955)   • norepinephrine Stopped (04/06/21 0555)   • vasopressin (PITRESSIN) infusion for shock Stopped (04/01/21 1630)   • dexmedetomidine 1.5 mcg/kg/hr (04/08/21 0148)   • fentanyl 175 mcg/hr (04 needed    ID–remains on antibiotic, cultures all negative so far      diastolic CHF exacerbation–echo reviewed and hyperdynamic LV, pulmonary hypertension now, monitor fluid status–diuretics     Transaminitis   -Possible related to underlying shock, monito

## 2021-04-08 NOTE — DIETARY NOTE
[facility]  NUTRITION ASSESSMENT    Pt does not meet malnutrition criteria at this time. NUTRITION INTERVENTION:  · Enteral Nutrition - Vital High Protein at goal rate of 40 ml/hr x 24 hrs.   · If no IVF, recommend free water flush of 115 ml q 4 nakita infection. Ensure enlive ordered at breakfast. +BM x2 3/13. Pt on 11L HFNC. Skin intact. No significant weight changes noted. Continue to encourage PO intake as able.      ANTHROPOMETRICS:  Ht: 165.1 cm (5' 5\")  Wt: 69.3 kg (152 lb 12.5 oz).   BMI: Body ma

## 2021-04-08 NOTE — PROGRESS NOTES
[id number] [facility] 149 Follow Up    Blank Sibley Patient Status:  Inpatient    1945 MRN [id number]   [facility] 4SW-A Attending Shahbaz Maire MD   Hosp Day # 34 PCP Esvin Stewart MD   Summary: 68 y/o make with rachaelut mL, 3 mL, Nebulization, 4 times per day  •  acetylcysteine (MUCOMYST) 20 % solution 600 mg, 600 mg, Oral, BID  •  Meropenem (MERREM) 500 mg in sodium chloride 0.9% 100 mL IVPB-MBP, 500 mg, Intravenous, Q8H  •  Heparin Sodium (Porcine) 5000 UNIT/ML injectio mcg in sodium chloride 0.9% 100 mL infusion, 0.2-1.5 mcg/kg/hr (Dosing Weight), Intravenous, Continuous  •  fentanyl (SUBLIMAZE) 1000 mcg/100 ml NS premix infusion,  mcg/hr, Intravenous, Continuous PRN  •  Albuterol Sulfate  (90 Base) MCG/ACT pulse 94, temperature 97.3 °F (36.3 °C), temperature source Bladder, resp. rate 23, height 5' 5\" (1.651 m), weight 152 lb 12.5 oz (69.3 kg), SpO2 92 %. Body mass index is 25.42 kg/m².     Non-verbal signs of pain present: NO    Physical Exam:  General: in

## 2021-04-08 NOTE — PROGRESS NOTES
[facility]                INFECTIOUS DISEASE PROGRESS NOTE    Cathlene Meals Patient Status:  Inpatient    1945 MRN [id number]   [facility] 5NW-A Attending Zahida Mtz MD   Hosp Day # 34 PCP Bryon Hollins MD     An  138 136   K 4.0 4.0 4.4    102 101   CO2 31.0 33.0* 31.0   ALKPHO 70 66 70   AST 50* 48* 60*   ALT 43 36 37   BILT 0.4 0.4 0.4   TP 6.4 6.2* 6.1*       Vancomycin Trough (ug/mL)   Date Value   04/04/2021 12.9     Microbiology    [facility] [place] [facility]

## 2021-04-08 NOTE — PLAN OF CARE
Received sedated on ventilator. Rass -5. Unsuccessful weaning sedation dt being asynchronous with ventilator with drop in O2 sats. Sedation an fiO2 increased. See mar an flowsheets. Sbp droping in 80's.  Critical care care apn made aware-lasix held and 250m

## 2021-04-08 NOTE — PROGRESS NOTES
patient remained on ventilator overnight. no RT changes were made. will continue to wean FiO2 as tolerated. will continue to monitor.      04/08/21 0547   Vent Information   $ RT Standby Charge (per 15 min) 1   Ventilator Initiation 04/01/21   Ventilation D

## 2021-04-09 NOTE — PALLIATIVE CARE NOTE
Chart reviewed and case d/w RNs while on unit. Current POC is congruent with family's communicated GOC. Will follow PRN.       SANJU Conklin  Palliative Care    4/9/2021  10:38 AM

## 2021-04-09 NOTE — PAYOR COMM NOTE
--------------  CONTINUED STAY REVIEW    Payor: Tiesha Viramontesmarissa #:  [id number]  Authorization Number: [id number]    Admit date: 3/10/21  Admit time:  8:16 PM    Admitting Physician: Reginaldo Beltran MD  Attending Physician:   Milton Owen MD Weight) Intravenous Sarina Garcia RN    4/8/2021 6837 Rate/Dose Change 1 mcg/kg/hr × 68 kg (Dosing Weight) Intravenous Charo Jones, RUTH      docusate sodium (COLACE) liquid 100 mg     Date Action Dose Route User    4/9/2021 0831 Given 100 mg Oral Neisha UNIT/ML injection 5,000 Units     Date Action Dose Route User    4/9/2021 1328 Given 5,000 Units Subcutaneous (Flank) Samantha Tamez RN    4/9/2021 0626 Given 5,000 Units Subcutaneous (Left Lower Abdomen) Deisy Link RN    4/8/2021 2253 Given 5,000 U Date Action Dose Route User    4/9/2021 1143 Given 10 mg Intravenous Claudene Rei, RN    4/9/2021 0413 Given 10 mg Intravenous Bia Stanford RN    4/9/2021 0000 Given 10 mg Intravenous Bia Stanford RN    4/8/2021 1715 Given 10 mg Intravenous So Date Action Dose Route User    4/9/2021 0829 Given 8.6 mg Oral Friddie Flower, RN      sodium chloride tab 1 g     Date Action Dose Route User    4/9/2021 0830 Given 1 g Oral Friddie Flower, RN      zinc sulfate (ZINCATE) cap 220 mg     Date Actio deficits   Lab Data Review:         Recent Labs   Lab 04/07/21 0421 04/08/21   0426 04/09/21 0424   WBC 4.7 5.1 6.8   HGB 8.7* 8.6* 8.3*   HCT 27.7* 27.8* 27.3*   .0 228.0 257.0           Recent Labs   Lab 04/07/21   0421 04/08/21 0426 04/09/2 infection related to steroids -wean dex to 6mg daily  · Remains on empiric abx, meropenem day 7; on ganciclovir as per ID; BAL with +CMV  · Continue lasix diuresis; follow lytes and fluid balance  · Continue to advance tube feeds as tolerated to goal  · Co with hypoxia ([address]    Goals of care, counseling/discussion    Palliative care by specialist              Plan:  Continue present management,     Acute respiratory failure, COVID-19 pneumonia–continue mechanical ventilation, follow ABG, pulmonary follow-up, pneumonia  Tested pos 3/10  Admitted 3/10, hypoxia, fever  -was treated with Remdesivir x 5 days, received TOCI, and on decadron  Progressive respiratory failure, intubated 4/1  SP BAL, cx NRF  PJP negative  Candida cw colonization     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

## 2021-04-09 NOTE — PROGRESS NOTES
[facility]  Progress Note    Perfecto Massey Patient Status:  Inpatient    1945 MRN [id number]   [facility] 4SW-A Attending Madelyn Alvarado MD   Hosp Day # 27 PCP Asia Morejon MD     Subjective:  Perfecto Massey is a(n) 7 04/09/21  0424   * 294* 375*   BUN 37* 39* 46*   CREATSERUM 0.77 0.82 0.75   GFRAA 102 99 103   GFRNAA 88 86 89   CA 8.5 8.4* 8.2*   ALB 2.2* 2.0* 2.0*    136 135*   K 4.0 4.4 4.3    101 99   CO2 33.0* 31.0 32.0   ALKPHO 66 70 70   AST 4 ARDS, will likely need escalated sedation to maintain respiratory/ventilator goals; if dyssynchrony and/or difficulty maintaining oxygenation, may need paralytic  · Wean FIO2 as tolerated for sats >89% and/or PaO2 >55  · Continue steroids -suspect the CMV

## 2021-04-09 NOTE — PROGRESS NOTES
[facility]                INFECTIOUS DISEASE PROGRESS NOTE    Janell Oleary Patient Status:  Inpatient    1945 MRN [id number]   [facility] 5NW-A Attending Cosme Mckeon MD   Hosp Day # [id number] Stevan Domingo PCP Massiel Rausch MD     An Date Value   04/04/2021 12.9     Microbiology    Hospital Encounter on 03/10/21   1.  BLOOD CULTURE     Status: None    Collection Time: 04/01/21  6:15 PM    Specimen: Bld,Picc Line; Blood   Result Value Ref Range    Blood Culture Result No Growth 5 Days

## 2021-04-09 NOTE — PROGRESS NOTES
Received pt on AC/PC 28/P30/50%/+5. DANNI [email or web address]. Nebs given Q6. Peak pressure high 30's throughout the night and plateau 29-22. Will continue to monitor.       04/09/21 0507   Vent Information   $ RT Standby Charge (per 15 min) 1  (vent check)   Ventilator I

## 2021-04-09 NOTE — PLAN OF CARE
Ventilator settings changed by  and RT, see note. Attempted to reduce sedation, pt became hypertensive SBP 170s. Sedation increased, Christiana PEACOCK notified. Daughter, Emmy Peterson, updated on sedation.

## 2021-04-09 NOTE — PROGRESS NOTES
Received patient on AC/PC 28/P30/50%/+5. Changed vent settings to VC+ 28/325/60%/+5 per MD. ABG done. Breath sounds are bilaterally diminished Patient is receiving nebulizer treatments Q6. Will continue to monitor.        04/09/21 1542   Vent Information

## 2021-04-09 NOTE — PLAN OF CARE
Pt received at 0730. Pt is sedated and unable to follow commands, or withdraw from painful stimulus. Sedated with fentanyl, precedex, and propofol. Deep sedation maintained for ventilator compliance per critical care service.  Pt required more FiO2 to incre

## 2021-04-09 NOTE — PROGRESS NOTES
[facility]  Progress Note    Trell Riggins Patient Status:  Inpatient    1945 MRN [id number]   [facility] 5NW-A Attending Rodger Martinez MD   Hosp Day # 27 PCP Isabelle Dominguez MD         SUBJECTIVE:  Subjective:  Zuly Banuelos 4.0 4.0 4.4 4.3      < > 107   < > 106 106 102 101 99   CO2 27.0   < > 28.0   < > 28.0 31.0 33.0* 31.0 32.0   BUN 33*   < > 38*   < > 43* 42* 37* 39* 46*   CREATSERUM 0.92   < > 0.91   < > 0.80 0.84 0.77 0.82 0.75   CA 8.1*   < > 8.2*   < > 8.0* 8.5 Date: 3/10/2021  PROCEDURE:  XR CHEST AP PORTABLE  (CPT=71045)  TECHNIQUE:  AP chest radiograph was obtained.   COMPARISON:  [facility] , XR, CHEST PA   LATERAL, 7/11/2005, 11:18 AM.  INDICATIONS:  pos covid here forl antibodies  PATIENT STATED HISTORY: (As tra (04/09/21 0900)   • vasopressin (PITRESSIN) infusion for shock Stopped (04/01/21 1630)   • dexmedetomidine 1.1 mcg/kg/hr (04/09/21 4972)   • fentanyl 150 mcg/hr (04/09/21 0837)     glucose **OR** Glucose-Vitamin C **OR** dextrose **OR** glucose **OR** Gluc needed    ID–remains on antibiotic, cultures all negative so far      diastolic CHF exacerbation–echo reviewed and hyperdynamic LV, pulmonary hypertension now, monitor fluid status–diuretics     Transaminitis   -Possible related to underlying shock, monito

## 2021-04-10 NOTE — PROGRESS NOTES
[facility]  Progress Note    Ramakrishna Vasquez Patient Status:  Inpatient    1945 MRN [id number]   [facility] 4SW-A Attending Brian Ann MD   [facility] Day # 32 PCP Eric Valencia MD     Subjective:  Ramakrishna Vasquez is a(n) 7 04/09/21  0424 04/10/21  0407   * 375* 115*   BUN 39* 46* 37*   CREATSERUM 0.82 0.75 0.63*   GFRAA 99 103 111   GFRNAA 86 89 96   CA 8.4* 8.2* 8.3*   ALB 2.0* 2.0* 1.9*    135* 139   K 4.4 4.3 3.3*    99 103   CO2 31.0 32.0 34.0*   ALKPH goals; if dyssynchrony and/or difficulty maintaining oxygenation, may need paralytic.   May also consider starting enteral regimen  · Wean FIO2 as tolerated for sats >89% and/or PaO2 >55  · Continue steroids -suspect the CMV infection related to steroids -w

## 2021-04-10 NOTE — PROGRESS NOTES
[facility]                INFECTIOUS DISEASE PROGRESS NOTE    Ailyn Messing Patient Status:  Inpatient    1945 MRN [id number]   [facility] 5NW-A Attending Daisy Fuller MD   Hosp Day # 32 PCP Guillermina Perry MD     An 04/04/2021 12.9     Microbiology    Hospital Encounter on 03/10/21   1.  BLOOD CULTURE     Status: None    Collection Time: 04/01/21  6:15 PM    Specimen: Bld,Picc Line; Blood   Result Value Ref Range    Blood Culture Result No Growth 5 Days N/A         R

## 2021-04-10 NOTE — PLAN OF CARE
Received sedated on ventilator. On precedex, fentanyl, and propofol for vent compliance. Continues to have episodes of asynchrony with ventilator and desaturates with turning/repositioning. SR on monitor. Lasix held and Levo restarted for sbp<90 per apn.  Donnell Busch

## 2021-04-10 NOTE — PROGRESS NOTES
Received intubated and sedated, Precedex, Fentanyl and Propofol per MAR, desats with turns and repositioning, NSR on monitor,OG with tube feeds at goal,turned every 2 hours, family updated throughout shift. Will monitor.

## 2021-04-10 NOTE — PROGRESS NOTES
Received patient on full vent support on below settings. No changes made throughout the night. Peak pressures occasionally high at 38-40, then drops back down to 28-32. Will continue to monitor.         04/10/21 0123   Vent Information   $ RT [employer]

## 2021-04-11 NOTE — PROGRESS NOTES
[facility]  Progress Note    Georganna Aase Patient Status:  Inpatient    1945 MRN [id number]   [facility] 5NW-A Attending Sawyer Courtney MD   Hosp Day # 32 PCP Shelbi Gilmore MD         SUBJECTIVE:  Subjective:  Hernán Turk K 4.1   < > 4.0   < > 4.0   < > 4.0 4.4 4.3 3.3* 4.9      < > 106   < > 106  --  102 101 99 103  --    CO2 28.0   < > 28.0   < > 31.0  --  33.0* 31.0 32.0 34.0*  --    BUN 38*   < > 43*   < > 42*  --  37* 39* 46* 37*  --    CREATSERUM 0.91   < > 0. 11:10 AM     Finalized by (CST): Hyun Barreto MD on 3/12/2021 at 11:11 AM       XR CHEST AP PORTABLE  (CPT=71045)    Result Date: 3/10/2021  PROCEDURE:  XR CHEST AP PORTABLE  (CPT=71045)  TECHNIQUE:  AP chest radiograph was obtained.   COMPARISON:  TAYLOR X (04/10/21 0334)   • norepinephrine Stopped (04/10/21 0900)   • vasopressin (PITRESSIN) infusion for shock Stopped (04/01/21 1630)   • dexmedetomidine 1 mcg/kg/hr (04/10/21 7831)   • fentanyl 150 mcg/hr (04/10/21 1297)     glucose **OR** Glucose-Vitamin C * pending PCR in blood   -ID and pulm on board   -c/w ganciclovir IV     Recent Hematemesis/Acute anemia  -Monitor, transfuse as needed    ID–remains on antibiotic, cultures all negative so far      diastolic CHF exacerbation–echo reviewed and hyperdynamic L

## 2021-04-11 NOTE — RESPIRATORY THERAPY NOTE
Received pt vented, VC+ 28/325/60%/+5, tolerating fairly well. Pt breath stacking and desatting. Sedation increased and able to bring FiO2 back down. Duoneb Q6. Will continue to monitor closely.

## 2021-04-11 NOTE — PROGRESS NOTES
[facility]  Progress Note    [facility] Patient Status:  Inpatient    1945 MRN [id number]   [facility] 5NW-A Attending Chicho Muñoz MD   Hosp Day # 28 PCP Saba Jackson MD         SUBJECTIVE:  Subjective:  Diann Serrato 4.1      < > 102  --  101 99 103  --  101   CO2 28.0   < > 33.0*  --  31.0 32.0 34.0*  --  33.0*   BUN 43*   < > 37*  --  39* 46* 37*  --  35*   CREATSERUM 0.80   < > 0.77  --  0.82 0.75 0.63*  --  0.67*   CA 8.0*   < > 8.5  --  8.4* 8.2* 8.3*  --  8 3/10/2021  PROCEDURE:  XR CHEST AP PORTABLE  (CPT=71045)  TECHNIQUE:  AP chest radiograph was obtained.   COMPARISON:  [facility] , XR, CHEST PA   LATERAL, 7/11/2005, 11:18 AM.  INDICATIONS:  pos covid here forl antibodies  PATIENT STATED HISTORY: (As transcrib (PITRESSIN) infusion for shock Stopped (04/01/21 1630)   • dexmedetomidine 1.3 mcg/kg/hr (04/11/21 0743)   • fentanyl 150 mcg/hr (04/11/21 0605)     HYDROmorphone HCl **OR** HYDROmorphone HCl, glucose **OR** Glucose-Vitamin C **OR** dextrose **OR** glucose Recent Hematemesis/Acute anemia  -Monitor, transfuse as needed    ID–remains on antibiotic, cultures all negative so far      diastolic CHF exacerbation–echo reviewed and hyperdynamic LV, pulmonary hypertension now, monitor fluid status–diuretics     T

## 2021-04-11 NOTE — PLAN OF CARE
Received patient following night RN. Patient is unresponsive- on propofol, precedex, and fentanyl drip. RASS -5 for ventilator compliance. Patient stacking breaths at times. Fentanyl drip switched over to a dilaudid infusion per Dr. Dfane Guo.  Vented on 60

## 2021-04-11 NOTE — PROGRESS NOTES
[facility]  Progress Note    Melanie Masroger Patient Status:  Inpatient    1945 MRN [id number]   [facility] 4SW-A Attending Dayanara Ashby MD   [facility] Day # 28 PCP Rony Guillen MD     Subjective:  Melanie Emma is a(n) 7 Lab 04/09/21  0424 04/09/21  0424 04/10/21  0407 04/10/21  1617 04/11/21  0404   *  --  115*  --  167*   BUN 46*  --  37*  --  35*   CREATSERUM 0.75  --  0.63*  --  0.67*   GFRAA 103  --  111  --  108   GFRNAA 89  --  96  --  93   CA 8.2*  --  8.3 plateau pressures; goal LTV strategy with plateau <35 - adjusted vent to attain goals. FIO2 requirements have been increasing the past few days. Plateau pressures in low 30s, Peak high 30s-low 40s.   · Continue airway clearance measures  · Continue sedati

## 2021-04-11 NOTE — PLAN OF CARE
Received sedated on ventilator. Rass -5 for ventilator compliance. Continues on propofol, precedex,and fentanyl drips with prn doses fentanyl given for overbreathing/stacking with drop in O2 sats and needing increased FiO2. SR on monitor.  Levo restarted an

## 2021-04-12 NOTE — PROGRESS NOTES
[facility]  Progress Note    Flora Palacios Patient Status:  Inpatient    1945 MRN [id number]   [facility] 4SW-A Attending Jerome Caldera MD   [address] Day # 35 PCP Yonny Jung MD     Subjective:  Flora Palacios is a(n) 7 04/12/21  0510   WBC 9.1 9.3 8.7   HGB 8.1* 7.9* 7.9*   HCT 26.8* 26.7* 25.9*   .0 299.0 288.0       Recent Labs   Lab 04/10/21  0407 04/10/21  0407 04/10/21  1617 04/11/21  0404 04/12/21  0510   *  --   --  167* 209*   BUN 37*  --   --  35* markers  · CKD  · HTN  · Hx RACHELLE  · Hyponatremia  · Microcytic anemia  · PIETRO  · Azotemia    Plan:  · Continue mechanical ventilation, follow serial ABG and plateau pressures; goal LTV strategy with plateau <64 - adjusted vent to attain goals.     · Continue

## 2021-04-12 NOTE — PLAN OF CARE
Received pt at 0700 intubated on 70%. O2 weaned down to 55% throughout day. Pt unresponsive to painful stimuli and does not show purposeful movements. PERRLA intact. Little oral secretions. Pt sedated on propofol, fentanyl, and dilaudid.  Dilaudid gtt turne

## 2021-04-12 NOTE — PROGRESS NOTES
04/12/21 1300   Vent Information   $ RT Standby Charge (per 15 min) 1   Ventilator Initiation 04/01/21   Ventilation Day(s) 12   Interface Invasive   Vent Type    Vent ID [id number]   Vent Mode VC+   Settings   FiO2 (%) 65 %   Resp Rate (Set) 30   Vt (Set,

## 2021-04-12 NOTE — PLAN OF CARE
Rec'd report from previous RN, care assumed at [address] [place] [address], pt assessed per flow sheets. Pt sedated with propofol, precedex, and dilaudid gtts. Pt with RASS -5, does not follow commands.   Pt remains intubated, tolerating vent settings, no issues with vent synch

## 2021-04-12 NOTE — RESPIRATORY THERAPY NOTE
Received pt vented, VC+ 28/325/70%/+5, tolerating well. Duoneb Q6. FiO2 decreased to 60%. Will continue to monitor closely.

## 2021-04-12 NOTE — PROGRESS NOTES
[facility]  Progress Note    Giuseppe Hill Patient Status:  Inpatient    1945 MRN [id number]   [facility] 5NW-A Attending Carlos Foy MD   [facility] Day # 35 PCP Astrid Del Real MD         SUBJECTIVE:  Subjective:  Loli Rodriguez BUN 39*  --  46* 37*  --  35* 35*   CREATSERUM 0.82  --  0.75 0.63*  --  0.67* 0.66*  0.66*   CA 8.4*  --  8.2* 8.3*  --  8.3* 8.5   MG  --   --  2.2  --   --   --   --    PHOS  --   --  3.1  --   --   --   --    *  --  375* 115*  --  167* 209* 7/11/2005, 11:18 AM.  INDICATIONS:  pos covid here forl antibodies  PATIENT STATED HISTORY: (As transcribed by Technologist)  Patient is COVID+ with worsening symptoms.     FINDINGS:  There is diffuse airspace disease, most notable within the mid and lower HYDROmorphone HCl **OR** HYDROmorphone HCl, glucose **OR** Glucose-Vitamin C **OR** dextrose **OR** glucose **OR** Glucose-Vitamin C, morphINE sulfate, acetaminophen **OR** acetaminophen **OR** acetaminophen, magnesium hydroxide, bisacodyl, Fleet Enema exacerbation–echo reviewed and hyperdynamic LV, pulmonary hypertension now, monitor fluid status–diuretics     Transaminitis   -Possible related to underlying shock, monitor trend        COVID-19  Intubated and sedated now due to worsening hypoxia, s/p criselda

## 2021-04-12 NOTE — PROGRESS NOTES
[facility]                INFECTIOUS DISEASE PROGRESS NOTE    Blank Sibley Patient Status:  Inpatient    1945 MRN [id number]   [facility] 5NW-A Attending Gloria Dangelo MD   [facility] Day # 35 PCP Esvin Stewart MD     An 34.0*  --   --  33.0* 37.0*   ALKPHO 67  --   --  67 74   AST 55*  --   --  51* 40*   ALT 30  --   --  28 28   BILT 0.2  --   --  0.2 0.2   TP 6.1*  --   --  6.2* 6.3*    < > = values in this interval not displayed.        Vancomycin Trough (ug/mL)   Date V

## 2021-04-13 NOTE — PLAN OF CARE
Assumed care of patient resting in bed, ventilated and sedated. Unresponsive to painful stimuli. PERRLA. On ventilator requiring increase in O2 needs overnight. Frequent suctioning. Weak cough and gag present. BP elevated at beginning of this shift.  Lopres

## 2021-04-13 NOTE — CM/SW NOTE
Care Progression Note:  Active Acute Medical Issue:   66 y/o male with acute hypoxemic respiratory failure due to COVID-19 pneumonia, intubated 4/1, currently on full vent support and sedated, started on ketamine for given hypoxia, continued tachypnea, dys

## 2021-04-13 NOTE — PAYOR COMM NOTE
--------------  CONTINUED STAY REVIEW    Payor: Andressamilagros Alexanderbenton Espino 673 #:  [id number]  Authorization Number: [id number]    Admit date: 3/10/21  Admit time:  8:16 PM    Admitting Physician: Karthik Keller MD  Attending Physician:   Madelyn Alvarado MD (PRECEDEX) 800 mcg in sodium chloride 0.9% 100 mL infusion     Date Action Dose Route User    4/13/2021 0923 New Bag 1.4 mcg/kg/hr × 68 kg (Dosing Weight) Intravenous Raymundo Hines RN    4/13/2021 0544 Rate/Dose Change 1.4 mcg/kg/hr × 68 kg (Dosing Weigh Given 2 mg Oral Fatmata Fuller RN    4/12/2021 1533 Given 2 mg Oral Ilene Ramon, RN      Insulin Aspart Pen (NOVOLOG) 100 UNIT/ML flexpen 5 Units     Date Action Dose Route User    4/13/2021 1150 Given 5 Units Subcutaneous (Right Lower Abdomen) Mhiir Santacruz (LOPRESSOR) tab 25 mg     Date Action Dose Route User    4/12/2021 1846 Given 25 mg Oral Ilene Ramon, RN      metoprolol Tartrate (LOPRESSOR) injection 5 mg     Date Action Dose Route User    4/13/2021 1205 Given 5 mg Intravenous Chasity Huitron RN Intravenous Felicia Crane RN    4/13/2021 0925 Rate/Dose Change 90 mcg/kg/min × 68 kg (Dosing Weight) Intravenous Felicia Crane RN    4/13/2021 0915 Rate/Dose Change 80 mcg/kg/min × 68 kg (Dosing Weight) Kiersten Crane RN    4/13/2021 0900 tachynea early this am. Low grade temp to 100.1.    Objective:   [facility]    04/13/21   0615 04/13/21   0626 04/13/21   0629 04/13/21   0720   BP:       BP Location:       Pulse: 92 92 94 110   Resp: (!) 27 (!) 30 (!) 30 (!) 32   Temp: 34.0* --  33.0* 37.0* 39.0*   ALKPHO 67 --  67 74 --    AST 55* --  51* 40* --    ALT 30 --  28 28 --    BILT 0.2 --  0.2 0.2 --    TP 6.1* --  6.2* 6.3* --    < > = values in this interval not displayed.           Recent Labs   Lab 04/09/21   0424 04/13/21 No tapse reported. · Proph: heparin subcutaneous, PPI  · Disp- ICU. Await family meeting tomorrow- planning 4/14 ~1pm    4/13 INTERNAL MED NOTE  SUBJECTIVE:  Subjective:  Norm Hollis is a(n) 67year old male.   pt sedated, unable to follow commands dexamethasone     CMV pneumonia  -CMV noted from BAL, pending PCR in blood   -ID and pulm on board   -c/w ganciclovir IV      Recent Hematemesis/Acute anemia  -Monitor, transfuse as needed     ID–remains on antibiotic, cultures all negative so far        d

## 2021-04-13 NOTE — PROGRESS NOTES
Palliative following PRN. Chart reviewed. Informed by CM of anticipated family meeting tomorrow, 4/14 at 1pm.    Palliative provider Dr. Danilo Calero or a palliative provider will attend meeting.     SANJU Lei  Palliative Care    4/13/2021  12:32 PM

## 2021-04-13 NOTE — PLAN OF CARE
Pt orally intubated, 100% fio2. Persistent tachypnea, desaturation, and high peak pressures this AM despite increasing propofol and precedex infusions and ketamine initiation.  Improved RR and spo2 saturations w/pharmaceutical paralyzation and pronation the

## 2021-04-13 NOTE — PROGRESS NOTES
[facility]  Progress Note    [facility] Patient Status:  Inpatient    1945 MRN [id number]   [facility] 4SW-A Attending Tamia Pugh MD   [address] Day # 29 PCP Saba Jackson MD     Subjective:  [facility] is a(n) 7 0.63*  --  0.67* 0.66*  0.66* 0.70  0.70   GFRAA 111  --  108 109  109 106  106   GFRNAA 96  --  93 94  94 92  92   CA 8.3*  --  8.3* 8.5 8.7   ALB 1.9*  --  1.9* 1.8*  --      --  137 138 136   K 3.3*   < > 4.1 3.8 3.7     --  101 100 96*   CO given worsening hypoxia  · Continue mechanical ventilation, follow serial ABG and plateau pressures; goal LTV strategy with plateau <78 - adjusted vent to attain goals.     · Continue airway clearance measures  · Wean FIO2 as tolerated for sats >89% and/or

## 2021-04-13 NOTE — PROGRESS NOTES
[facility]  Progress Note    Mickie Led Patient Status:  Inpatient    1945 MRN [id number]   [facility] 5NW-A Attending Maura Joseph MD   [facility] Day # 29 PCP Jael Garza MD         SUBJECTIVE:  Subjective:  Janelle eRcinos 0.75  --  0.63*  --  0.67* 0.66*  0.66* 0.70  0.70   CA 8.2*  --  8.3*  --  8.3* 8.5 8.7   MG 2.2  --   --   --   --   --  1.9   PHOS 3.1  --   --   --   --   --  3.3   *  --  115*  --  167* 209* 73    < > = values in this interval not displayed. antibodies  PATIENT STATED HISTORY: (As transcribed by Technologist)  Patient is COVID+ with worsening symptoms. FINDINGS:  There is diffuse airspace disease, most notable within the mid and lower lungs, consistent with multifocal COVID-19 pneumonia.   Leola Gaston (04/13/21 0900)   • vasopressin (PITRESSIN) infusion for shock Stopped (04/01/21 1630)   • dexmedetomidine 1.4 mcg/kg/hr (04/13/21 0923)     cisatracurium (NIMBEX) bolus from bag, HYDROmorphone HCl **OR** HYDROmorphone HCl, glucose **OR** Glucose-Vitamin C in blood   -ID and pulm on board   -c/w ganciclovir IV     Recent Hematemesis/Acute anemia  -Monitor, transfuse as needed    ID–remains on antibiotic, cultures all negative so far      diastolic CHF exacerbation–echo reviewed and hyperdynamic LV, pulmonary

## 2021-04-13 NOTE — PROGRESS NOTES
04/13/21 1528   Vent Information   $ RT Standby Charge (per 15 min) 1   Ventilator Initiation 04/01/21   Ventilation Day(s) 13   Interface Invasive   Vent Type    Vent ID 10   Vent Mode VC+   Settings   FiO2 (%) 90 %   Resp Rate (Set) 30   Vt (Set,

## 2021-04-13 NOTE — DIETARY NOTE
[facility]  NUTRITION ASSESSMENT    Pt does not meet malnutrition criteria at this time. NUTRITION INTERVENTION:  · Enteral Nutrition - Vital High Protein at goal rate of 40 ml/hr x 24 hrs.   · If no IVF, recommend free water flush of 115 ml q 4 nakita d/t LOS. 67year old male admitted with COVID -19. PMHx of DM,RACHELLE. Nutrition history not obtained at this time. Nursing notes reports Percent Meals Eaten (%): 60 % intake for last meal. Will send Ensure HP BID to maximize nutrition.   Suspect appetite lower Mami Orlando MS, RD, LDN  Clinical Dietitian  Pager# [phone]

## 2021-04-13 NOTE — PROGRESS NOTES
[facility]                INFECTIOUS DISEASE PROGRESS NOTE    Georganna Aase Patient Status:  Inpatient    1945 MRN [id number]   [facility] 5NW-A Attending Sawyer Courtney MD   [facility] Day # 29 PCP Shelbi Gilmore MD     An 3.7     --  101 100 96*   CO2 34.0*  --  33.0* 37.0* 39.0*   ALKPHO 67  --  67 74  --    AST 55*  --  51* 40*  --    ALT 30  --  28 28  --    BILT 0.2  --  0.2 0.2  --    TP 6.1*  --  6.2* 6.3*  --     < > = values in this interval not displayed.

## 2021-04-13 NOTE — PROGRESS NOTES
Received pt on VC+ 30/300/60%/+5. Nebs given Q6. Jacki Hopkins Pt breath stacking at times. FiO2 increased to 75% throughout the night due to pt desaturating. Will continue to monitor and wean as tolerated.       04/13/21 0310   Vent Information   $ RT [employer] (p

## 2021-04-14 NOTE — PROGRESS NOTES
[address] [facility] 149 Follow Up    Flora Palacios Patient Status:  Inpatient    1945 MRN [id number]   [facility] 4SW-A Attending Jerome Caldera MD   [address] Day # 28 PCP Yonny Jung MD       Subjective:  Cam Estrada HCl (RENATO-SYNEPHRINE) 100 mg in sodium chloride 0.9% 250 mL infusion, 100-200 mcg/min, Intravenous, Continuous  •  ketamine (KETALAR) 500 mg in sodium chloride 0.9% 250 mL infusion for ICU sedation, 0.2 mg/kg/hr (Dosing Weight), Intravenous, Continuous  • (CYTOVENE) 170 mg in sodium chloride 0.9% 100 mL IVPB, 2.5 mg/kg, Intravenous, Q24H  •  aspirin chewable tab 81 mg, 81 mg, Oral, Daily  •  ipratropium-albuterol (DUONEB) nebulizer solution 3 mL, 3 mL, Nebulization, 4 times per day  •  Heparin Sodium (Porci Oral, Daily PRN  •  sodium chloride tab 1 g, 1 g, Oral, Daily  •  Saline Nasal Spray (SALINE MIST) 1 spray, 1 spray, Each Nare, Q3H PRN  •  sodium chloride 0.9% IV bolus 500 mL, 500 mL, Intravenous, PRN  •  ascorbic acid (VITAMIN C) tab 1,000 mg, 1,000 mg, oxygenation and requiring multiple pressors. -- family elected for comfort measures and withdrawal of life sustaining measures. --Pt  once pressors were stopped.      Emotion support provided to patient/family today: Yes      A total of 55 minutes

## 2021-04-14 NOTE — PLAN OF CARE
Rec'd report from previous RN, care assumed at [address] [facility], pt assessed per flow sheets. Pt sedated and chemically paralyzed. BIS goal 50-70 maintained, RASS -5. Pt with +cough and gag, though weak.   Pt with train of four measured to left ulnar, 4/4 twitches n initially responding with mild effect to BP, then pt quickly desatted to low 80s, and continued to desat despite vent changes by respiratory therapy. Kaelyn Muhammad APN also at bedside and orders rec'd with no improvement.   Family notified and arrived at bedside

## 2021-04-14 NOTE — PROGRESS NOTES
[facility]  Progress Note    Narendra Reyes Patient Status:  Inpatient    1945 MRN [id number]   [facility] 4SW-A Attending Caity Thompson MD   Hosp Day # 28 PCP Taco Miguel MD     Subjective:  Narendra Reyes is a(n) 7 --  0.67*   < > 0.66*  0.66* 0.70  0.70 1.16  1.16   GFRAA 111  --  108   < > 109  109 106  106 70  70   GFRNAA 96  --  93   < > 94  94 92  92 61  61   CA 8.3*  --  8.3*   < > 8.5 8.7 8.3*   ALB 1.9*  --  1.9*  --  1.8*  --   --      --  137   < > 13 RACHELLE  · Hyponatremia  · Microcytic anemia  · PIETRO    Plan:  · Continue mechanical ventilation, follow serial ABG and plateau pressures; goal LTV strategy with plateau <48 - adjusted vent to attain goals. FIO2 requirements maxed, now paralyzed.   Airway press

## 2021-04-14 NOTE — PROGRESS NOTES
Current vent settings noted below. Patient was noted to be hypoxic so an ABG was drawn showing resp acidosis with hypoxia, called and reported. Patient was placed supine, unable to tolerate prone any longer.  PEEP was increased to 8, peak pressures are in

## 2021-04-14 NOTE — PROGRESS NOTES
[facility]  Progress Note    Lourdes Sarabia Patient Status:  Inpatient    1945 MRN [id number]   [facility] 5NW-A Attending Diomedes Richard MD   Hosp Day # 28 PCP Jcarlos Sosa MD         SUBJECTIVE:  Subjective:  Kaye Gaines 4.9 4.1 3.8 3.7 3.8   CL 99   < > 103  --   --  101 100 96* 95*   CO2 32.0   < > 34.0*  --   --  33.0* 37.0* 39.0* 33.0*   BUN 46*   < > 37*  --   --  35* 35* 39* 43*   CREATSERUM 0.75   < > 0.63*  --   --  0.67* 0.66*  0.66* 0.70  0.70 1.16  1.16   CA 8.2 PORTABLE  (CPT=71045)    Result Date: 3/10/2021  PROCEDURE:  XR CHEST AP PORTABLE  (CPT=71045)  TECHNIQUE:  AP chest radiograph was obtained.   COMPARISON:  [facility] , XR, CHEST PA   LATERAL, 7/11/2005, 11:18 AM.  INDICATIONS:  pos covid here forl antibodies infusion 4.5 mcg/min (04/14/21 1200)   • norepinephrine 30 mcg/min (04/14/21 1159)   • ketamine (KETALAR) infusion for ICU sedation 0.2 mg/kg/hr (04/13/21 0935)   • cisatracurium (NIMBEX) infusion 3 mcg/kg/min (04/14/21 0120)   • HYDROmorphone Stopped (04/ needed and RT to follow  -Overnight and this morning his hypoxia worsened and worsening of his acidosis, pH <7.1, he has now been made DNAR/full tx, pending family discussions with pulmonary and palliative care team today, overall pt is declining now tai

## 2021-04-14 NOTE — PLAN OF CARE
Received pt at 0700 intubated on 100%, paralyzed, and on 2 vasopressors levo and vaso. Due to continuous hypotension 2 more pressors were added travis and epi. Pt is on maximum dose per hour on all vasopressors (see MAR).  Pt BIS numbers reading between 16-30s

## 2021-04-14 NOTE — PROGRESS NOTES
04/14/21 0825   Clinical Encounter Type   Visited With Family   Crisis Visit Critical care   Referral From Family   Referral To    Patient Spiritual Encounters   Spiritual Needs Patient's daughter concerned about patient's current health status

## 2021-04-15 NOTE — PAYOR COMM NOTE
--------------  DISCHARGE REVIEW    Payor: Jayna Espino 673 #:  [id number]  Authorization Number: [id number]    Admit date: 3/10/21  Admit time:   8:16 PM  Discharge Date: 4/14/2021  4:21 PM     Admitting Physician:  More Flores MD  Attending

## 2021-04-16 NOTE — DISCHARGE SUMMARY
[facility]  Discharge Summary    Perfecto Massey Patient Status:  Inpatient    1945 MRN [id number]   [facility] 4SW-A Attending No att. providers found   [facility] Day # 28 PCP Asia Morejon MD     Date of Admission: 3/10/2021 (FMW=51787)    Result Date: 4/4/2021  PROCEDURE:  XR ABDOMEN (1 VIEW) (CPT=74018)  INDICATIONS:  increased NG output, hypoactive bowel sounds  COMPARISON:  [facility] , XR, XR CHEST AP PORTABLE  (CPT=71045), 4/01/2021, 4:33 PM.  TECHNIQUE:  Supine AP view was Antonio Loomis MD on 4/08/2021 at 11:13 AM     Finalized by (CST): Antonio Loomis MD on 4/08/2021 at 11:15 AM       CT ANGIOGRAPHY, CHEST (CPT=71275)    Result Date: 3/31/2021  PROCEDURE:  CT ANGIOGRAPHY, CHEST (CPT=71275)  COMPARISON:  [facility] , CT, CT ANGIOGRAPHY toxicity and connective tissue disease can cause a similar imaging pattern. This category includes multiple peripheral, rounded bilateral ground glass infiltrates with or without consolidation and with or without visible intralobular lines (crazy paving). Roberto Moeller MD on 3/27/2021 at 9:11 PM       XR CHEST AP PORTABLE  (CPT=71045)    Result Date: 4/14/2021  PROCEDURE:  XR CHEST AP PORTABLE  (CPT=71045)  TECHNIQUE:  AP chest radiograph was obtained.   COMPARISON:  [facility] , XR, XR CHEST AP PORTABLE  (CPT=71045), DO Salvatore on 4/13/2021 at 9:20 AM       XR CHEST AP PORTABLE  (CPT=71045)    Result Date: 4/11/2021  PROCEDURE:  XR CHEST AP PORTABLE  (CPT=71045)  TECHNIQUE:  AP chest radiograph was obtained.   COMPARISON:  [facility] , XR, XR CHEST AP/PA (1 VIEW) (CPT=71045) Salo Wright MD on 4/08/2021 at 6:44 AM     Finalized by (CST): Carmel Hinojosa MD on 4/08/2021 at 6:48 AM       XR CHEST AP PORTABLE  (CPT=71045)    Result Date: 4/7/2021  PROCEDURE:  XR CHEST AP PORTABLE  (CPT=71045)  TECHNIQUE:  AP chest radiograph was obtained.   CO AP PORTABLE  (CPT=71045)    Result Date: 4/1/2021  PROCEDURE:  XR CHEST AP PORTABLE  (CPT=71045)  TECHNIQUE:  AP chest radiograph was obtained.   COMPARISON:  [facility] , XR, XR CHEST AP PORTABLE  (CPT=71045), 3/28/2021, 5:04 AM.  INDICATIONS:  hypoxia  PATIEN (CPT=71045)    Result Date: 3/27/2021  PROCEDURE:  XR CHEST AP PORTABLE  (CPT=71045)  TECHNIQUE:  AP chest radiograph was obtained.   COMPARISON:  [facility] , XR, XR CHEST AP PORTABLE  (CPT=71045), 3/26/2021, 7:40 PM.  INDICATIONS:  hypoxia  PATIENT STATED HIS (CST): Greg Ascencio MD on 3/26/2021 at 7:59 PM       XR CHEST AP PORTABLE  (CPT=71045)    Result Date: 3/25/2021  PROCEDURE:  XR CHEST AP PORTABLE  (CPT=71045)  TECHNIQUE:  AP chest radiograph was obtained.   COMPARISON:  [facility] , XR, XR CHEST AP PORTABLE  (C 1:27 PM       CARD ECHO 2D DOPPLER CONTRAST (CPT=93306)    Result Date: 4/1/2021                                                     *[facility]* technically sufficient to allow    evaluation of LV diastolic function. 2. Right ventricle: The cavity size was normal. Systolic function was    hyperdynamic.  3. Pulmonary arteries: Systolic pressure was mildly increased, in the range    of 40mm Hg to 45mm aortic root was normal. Ascending aorta:  The ascending aorta was normal. Pulmonary artery:   Systolic pressure was mildly increased, in the range of 40mm Hg to 45mm Hg. ---------------------------------------------------------------------------- Measuremen 2.97  m/sec  ---------   Systemic veins                                  Value        Reference  Estimated CVP                                   5     mm Hg  ---------   Right ventricle                                 Value        Reference  RV pressure -Possible related to underlying shock, monitor trend          COVID-19  Intubated and sedated now due to worsening hypoxia, s/p tocilizumab, and CCP and remdesvir            Neuropathy–gabapentin, currently off oral medications     Hypertension–holding a

## 2022-01-21 NOTE — ANESTHESIA PROCEDURE NOTES
Airway  Date/Time: 4/1/2021 4:00 PM  Urgency: emergent      General Information and Staff    Patient location during procedure: ICU  Anesthesiologist: Diamond Valenzuela MD  Performed: anesthesiologist     Consent for Airway (if performed for an anesthetic, s
room air

## 2022-02-23 NOTE — PLAN OF CARE
Problem: Diabetes/Glucose Control  Goal: Glucose maintained within prescribed range  Description: INTERVENTIONS:  - Monitor Blood Glucose as ordered  - Assess for signs and symptoms of hyperglycemia and hypoglycemia  - Administer ordered medications to m saturation or ABGs  - Provide Smoking Cessation handout, if applicable  - Encourage broncho-pulmonary hygiene including cough, deep breathe, Incentive Spirometry  - Assess the need for suctioning and perform as needed  - Assess and instruct to report SOB o [id number]

## 2023-07-12 NOTE — PROGRESS NOTES
04/06/21 0309   Vent Information   $ RT Standby Charge (per 15 min) 1   Ventilator Initiation 04/01/21   Ventilation Day(s) 6   Interface Invasive   Vent Type    Vent ID [id number]   Vent Mode VC+   Settings   FiO2 (%) 80 %   Resp Rate (Set) 22   Vt (Set Accessed site: left axillary vein.  Via Micropuncture

## 2024-09-22 NOTE — PAYOR COMM NOTE
--------------  CONTINUED STAY REVIEW    Payor: Jasbir Mcclellan #:  [id number]  Authorization Number: [id number]    Admit date: 3/10/21  Admit time:  8:16 PM    Admitting Physician: Mirna Jaramillo MD  Attending Physician:   Jerome Caldera MD RN    3/21/2021 2001 Given 600 mg Oral Kelly Hudson RN      Insulin Aspart Pen (NOVOLOG) 100 UNIT/ML flexpen 1-40 Units     Date Action Dose Route User    3/22/2021 1242 Given 17 Units Subcutaneous (Left Lower Abdomen) Owen Shoemaker, RUTH    3/22/2021 09 [FreeTextEntry1] : 77 yo female with h/o as above including preDM and hypothyroidism here for concern about her liver after found to have elevated transaminases on prior labs. 1.  GI - will recheck hepatic panel and hepatitis titers and further plan pending results, will check hep A for immunity as well in case does have underlying liver disease (such as MAFLD) and would benefit from hep A vaccine in the future 2.  HCM - offered flu shot but would like to hold until next month; agreeable to PCV 20 now, given 3.  RTO few months cpe when due     Dose Route User    3/22/2021 0904 Given 6 mg Oral Bonilla Casas RN        3/20 ID NOTE  Antibiotics: Remdesivir completed  Decadron  Toci x1     Subjective:     On 02 9L high flow     Objective:  Temp:  [97.4 °F (36.3 °C)-98.3 °F (36.8 °C)] 98.2 °F (36 03/16/21  0539 03/17/21  0620 03/18/21  0444 03/19/21  0445   * 129* 130* 131* 132*   K 4.3 4.3 4.7 4.5 4.8    96* 98 100 101   CO2 23.0 24.0 26.0 22.0 25.0   BUN 48* 59* 73* 61* 56*   CREATSERUM 1.32* 1.57* 1.49* 1.40* 1.14   CA 8.4* 8.6 8.2* mortality and oxygenation     s/p toci x 1       Hyponatremia/PIETRO  Gentle hydration and monitor  -sodium and Cr worsening, nephro consult placed, may be due to overdiuresis   -lasix on hold, Cr trending back towards baseline        Pneumonia due to COVID-1 labored  Abdomen:non distended  Musculoskeletal: joints: no swelling   Labs:   Recent Labs   Lab 03/21/21  0524   RBC 5.83*   HGB 13.2   HCT 41.2   MCV 70.7*   MCH 22.6*   MCHC 32.0   RDW 14.1   NEPRELIM 14.36*   WBC 15.3*   .0                  Rece 14 point review of systems was completed.    Pertinent positives and negatives noted in the HPI.     OBJECTIVE:  Vitals[]Expand by Default     03/22/21  0509 03/22/21  0700 03/22/21  0859 03/22/21  1215   BP: 133/75   129/63 125/81   BP Location: Left arm   dexamethasone to 6 mg a day  · completed remdesivir 3/15, received tocilizumab 3/12  · Azotemia is from steroids  · Received lasix and saline and Nacl tabs per renal for hyponatremia. Na improving  · Follow inflammatory markers and d dimer closely. Tonia Sol

## (undated) DEVICE — FORCEP BIOPSY RJ4 LG CAP W/ND

## (undated) DEVICE — 1200CC GUARDIAN II: Brand: GUARDIAN

## (undated) DEVICE — 3M™ RED DOT™ MONITORING ELECTRODE WITH FOAM TAPE AND STICKY GEL, 50/BAG, 20/CASE, 72/PLT 2570: Brand: RED DOT™

## (undated) DEVICE — Device: Brand: DEFENDO AIR/WATER/SUCTION AND BIOPSY VALVE

## (undated) DEVICE — FILTERLINE NASAL ADULT O2/CO2

## (undated) DEVICE — ENDOSCOPY PACK UPPER: Brand: MEDLINE INDUSTRIES, INC.

## (undated) DEVICE — MASK ETCO2 PANORAMIC

## (undated) NOTE — LETTER
Maki Aden 89  [address] [facility] [address], [address] [facility]  Authorization for Surgical Operation and Procedure     Date:___________                                                                                                         Time:__________ are some, but not all, of the potential risks that can occur: fever and allergic reactions, hemolytic reactions, transmission of diseases such as Hepatitis, AIDS and Cytomegalovirus (CMV) and fluid overload.   In the event that I wish to have an autologous surgeon or my attending physician will determine when the applicable recovery period ends for purposes of reinstating the DNAR order.   10. Patients having a sterilization procedure: I understand that if the procedure is successful the results will be perma to: a. Allow the anesthesiologist (anesthesia doctor) to give me medicine and do additional procedures as necessary.  Some examples are: Starting or using an “IV” to give me medicine, fluids or blood during my procedure, and having a breathing tube placed (“spinal”, “epidural”, & “nerve blocks”): I understand that rare but potential complications include headache, bleeding, infection, seizure, irregular heart rhythms, and nerve injury.     I can change my mind about having anesthesia services at any time be

## (undated) NOTE — LETTER
[address] [facility] FOR BLOOD OR BLOOD COMPONENTS      In the course of your treatment, it may become necessary to administer a transfusion of blood or blood components.  This form provides basic information concerning this proc alternatives to you if it has not already been done. I,Kevin Perez, have read/had read to me the above. I understand the matters bearing on the decision whether or not to authorize a transfusion of blood or blood components.  I have no questions whic

## (undated) NOTE — LETTER
[facility] [address], [address] [place] [address]    Consent for Anesthesia   1.    Gricelda WILLIAMSON Mews agree to be cared for by a physician anesthesiologist alone and/or with a nurse anesthetist, who is specially trained to monitor me and give m allergic reactions to medications, injury to my airway, heart, lungs, vision, nerves, or muscles and in extremely rare instances death. 5. My doctor has explained to me other choices available to me for my care (alternatives).   6. Pregnant Patients (“epid Printed: 3/28/2021 at 2:18 PM    Medical Record #: [id number]                                            Page 1 of 1

## (undated) NOTE — LETTER
[address] [facility]     I agree to have a Peripherally Inserted Central Catheter (PICC) placed in my arm.    1. The PICC insertion procedure, care, maintenance, risks, benefits, and complications have been explained to me by my physic including risks, benefits, and side effects related to the alternatives and risks related to not receiving this procedure. 8.  I have expressed any questions about this procedure to my physician or the PICC Proceduralist and he/she has answered them.   I